# Patient Record
Sex: MALE | Race: ASIAN | NOT HISPANIC OR LATINO | Employment: OTHER | URBAN - METROPOLITAN AREA
[De-identification: names, ages, dates, MRNs, and addresses within clinical notes are randomized per-mention and may not be internally consistent; named-entity substitution may affect disease eponyms.]

---

## 2017-06-14 ENCOUNTER — HOSPITAL ENCOUNTER (OUTPATIENT)
Dept: LAB | Facility: MEDICAL CENTER | Age: 82
End: 2017-06-14
Attending: INTERNAL MEDICINE
Payer: MEDICARE

## 2017-06-14 DIAGNOSIS — I10 ESSENTIAL HYPERTENSION: ICD-10-CM

## 2017-06-14 DIAGNOSIS — E78.00 PURE HYPERCHOLESTEROLEMIA: ICD-10-CM

## 2017-06-14 DIAGNOSIS — E11.9 CONTROLLED TYPE 2 DIABETES MELLITUS WITHOUT COMPLICATION, WITHOUT LONG-TERM CURRENT USE OF INSULIN (HCC): ICD-10-CM

## 2017-06-14 LAB
ALBUMIN SERPL BCP-MCNC: 4.1 G/DL (ref 3.2–4.9)
ALBUMIN/GLOB SERPL: 1.3 G/DL
ALP SERPL-CCNC: 63 U/L (ref 30–99)
ALT SERPL-CCNC: 26 U/L (ref 2–50)
ANION GAP SERPL CALC-SCNC: 7 MMOL/L (ref 0–11.9)
AST SERPL-CCNC: 17 U/L (ref 12–45)
BILIRUB SERPL-MCNC: 0.9 MG/DL (ref 0.1–1.5)
BUN SERPL-MCNC: 17 MG/DL (ref 8–22)
CALCIUM SERPL-MCNC: 9.4 MG/DL (ref 8.5–10.5)
CHLORIDE SERPL-SCNC: 105 MMOL/L (ref 96–112)
CHOLEST SERPL-MCNC: 121 MG/DL (ref 100–199)
CO2 SERPL-SCNC: 25 MMOL/L (ref 20–33)
CREAT SERPL-MCNC: 1.04 MG/DL (ref 0.5–1.4)
CREAT UR-MCNC: 150 MG/DL
EST. AVERAGE GLUCOSE BLD GHB EST-MCNC: 160 MG/DL
GFR SERPL CREATININE-BSD FRML MDRD: >60 ML/MIN/1.73 M 2
GLOBULIN SER CALC-MCNC: 3.2 G/DL (ref 1.9–3.5)
GLUCOSE SERPL-MCNC: 146 MG/DL (ref 65–99)
HBA1C MFR BLD: 7.2 % (ref 0–5.6)
HDLC SERPL-MCNC: 50 MG/DL
LDLC SERPL CALC-MCNC: 51 MG/DL
MICROALBUMIN UR-MCNC: 17.1 MG/DL
MICROALBUMIN/CREAT UR: 114 MG/G (ref 0–30)
POTASSIUM SERPL-SCNC: 4.2 MMOL/L (ref 3.6–5.5)
PROT SERPL-MCNC: 7.3 G/DL (ref 6–8.2)
SODIUM SERPL-SCNC: 137 MMOL/L (ref 135–145)
TRIGL SERPL-MCNC: 99 MG/DL (ref 0–149)

## 2017-06-14 PROCEDURE — 80061 LIPID PANEL: CPT

## 2017-06-14 PROCEDURE — 36415 COLL VENOUS BLD VENIPUNCTURE: CPT

## 2017-06-14 PROCEDURE — 82043 UR ALBUMIN QUANTITATIVE: CPT

## 2017-06-14 PROCEDURE — 80053 COMPREHEN METABOLIC PANEL: CPT

## 2017-06-14 PROCEDURE — 82570 ASSAY OF URINE CREATININE: CPT

## 2017-06-14 PROCEDURE — 83036 HEMOGLOBIN GLYCOSYLATED A1C: CPT | Mod: GA

## 2017-06-15 ENCOUNTER — HOSPITAL ENCOUNTER (OUTPATIENT)
Facility: MEDICAL CENTER | Age: 82
End: 2017-06-15
Attending: INTERNAL MEDICINE
Payer: MEDICARE

## 2017-06-15 PROCEDURE — 82274 ASSAY TEST FOR BLOOD FECAL: CPT

## 2017-06-18 DIAGNOSIS — Z12.11 SCREENING FOR COLON CANCER: ICD-10-CM

## 2017-06-18 LAB — HEMOCCULT STL QL IA: NEGATIVE

## 2017-06-20 ENCOUNTER — OFFICE VISIT (OUTPATIENT)
Dept: MEDICAL GROUP | Age: 82
End: 2017-06-20
Payer: MEDICARE

## 2017-06-20 VITALS
DIASTOLIC BLOOD PRESSURE: 82 MMHG | SYSTOLIC BLOOD PRESSURE: 140 MMHG | WEIGHT: 170 LBS | BODY MASS INDEX: 28.32 KG/M2 | OXYGEN SATURATION: 97 % | HEART RATE: 73 BPM | HEIGHT: 65 IN | TEMPERATURE: 98.1 F

## 2017-06-20 DIAGNOSIS — Z23 NEED FOR PNEUMOCOCCAL VACCINATION: ICD-10-CM

## 2017-06-20 DIAGNOSIS — R80.9 MICROALBUMINURIA: ICD-10-CM

## 2017-06-20 DIAGNOSIS — E78.00 PURE HYPERCHOLESTEROLEMIA: ICD-10-CM

## 2017-06-20 DIAGNOSIS — E11.9 CONTROLLED TYPE 2 DIABETES MELLITUS WITHOUT COMPLICATION, WITHOUT LONG-TERM CURRENT USE OF INSULIN (HCC): ICD-10-CM

## 2017-06-20 DIAGNOSIS — I10 ESSENTIAL HYPERTENSION: ICD-10-CM

## 2017-06-20 PROCEDURE — 90670 PCV13 VACCINE IM: CPT | Performed by: INTERNAL MEDICINE

## 2017-06-20 PROCEDURE — 99214 OFFICE O/P EST MOD 30 MIN: CPT | Mod: 25 | Performed by: INTERNAL MEDICINE

## 2017-06-20 PROCEDURE — G0009 ADMIN PNEUMOCOCCAL VACCINE: HCPCS | Performed by: INTERNAL MEDICINE

## 2017-06-20 ASSESSMENT — PATIENT HEALTH QUESTIONNAIRE - PHQ9: CLINICAL INTERPRETATION OF PHQ2 SCORE: 0

## 2017-06-20 ASSESSMENT — PAIN SCALES - GENERAL: PAINLEVEL: NO PAIN

## 2017-06-20 NOTE — ASSESSMENT & PLAN NOTE
Patient is not taking medication for diabetes. He tries to control his diabetes with diet and exercise. He admitted that his diet has not been very well controlled in past 5 weeks during cruise trip. His A1c was 7.2 on 6/14/17. Patient stated that he has eye exams with ophthalmologist 2 weeks ago. He will bring his eye exam report to us. He stated that he did not have retinopathy.

## 2017-06-20 NOTE — MR AVS SNAPSHOT
"Jim Valle   2017 8:40 AM   Office Visit   MRN: [id number]    Department:  [address] [place] [address]   Dept Phone:  [phone]    Description:  Male : 1935   Provider:  Afua Pinon M.D.           Reason for Visit     Hypertension lab review      Allergies as of 2017     Allergen Noted Reactions    No Known Drug Allergy 2014         You were diagnosed with     Pure hypercholesterolemia   [272.0.ICD-9-CM]       Essential hypertension   [1608696]       Controlled type 2 diabetes mellitus without complication, without long-term current use of insulin (CMS-HCC)   [6438991]       Microalbuminuria   [703708]       Need for pneumococcal vaccination   [491050]         Vital Signs     Blood Pressure Pulse Temperature Height Weight Body Mass Index    140/82 mmHg 73 36.7 °C (98.1 °F) 1.659 m (5' 5.32\") 77.111 kg (170 lb) 28.02 kg/m2    Oxygen Saturation Smoking Status                97% Never Smoker           Basic Information     Date Of Birth Sex Race Ethnicity Preferred Language    1935 Male Unknown Unknown English      Your appointments     Oct 05, 2017 11:00 AM   Established Patient with Afua Pinon M.D.   [address] [address] [phone]           You will be receiving a confirmation call a few days before your appointment from our automated call confirmation system.              Problem List              ICD-10-CM Priority Class Noted - Resolved    Pure hypercholesterolemia E78.00   2014 - Present    Essential hypertension I10   2014 - Present    Hearing loss of right ear H91.91   2014 - Present    Controlled type 2 diabetes mellitus without complication, without long-term current use of insulin (CMS-HCC) E11.9   12/3/2014 - Present    Microalbuminuria R80.9   2015 - Present      Health Maintenance        Date Due Completion Dates    RETINAL SCREENING 11/3/1955 ---    IMM DTaP/Tdap/Td Vaccine " (1 - Tdap) 11/3/1956 ---    IMM ZOSTER VACCINE 11/3/1997 ---    IMM PNEUMOCOCCAL 65+ (ADULT) LOW/MEDIUM RISK SERIES (2 of 2 - PCV13) 10/20/2012 10/20/2011    DIABETES MONOFILAMENT / LE EXAM 11/24/2016 11/24/2015 (Done), 12/3/2014 (Done)    Override on 11/24/2015: Done    Override on 12/3/2014: Done    A1C SCREENING 12/14/2017 6/14/2017, 10/3/2016, 3/7/2016, 3/3/2016, 11/24/2015, 6/3/2015, 11/24/2014, 8/25/2014    FASTING LIPID PROFILE 6/14/2018 6/14/2017, 10/3/2016, 3/3/2016, 6/3/2015, 8/25/2014    URINE ACR / MICROALBUMIN 6/14/2018 6/14/2017, 3/3/2016, 6/3/2015, 8/25/2014    SERUM CREATININE 6/14/2018 6/14/2017, 10/3/2016, 3/3/2016, 6/3/2015, 8/25/2014    COLONOSCOPY 10/10/2026 10/10/2016 (Declined), 5/29/2014 (Declined)    Override on 10/10/2016: Patient Declined    Override on 5/29/2014: Patient Declined (had flex sig 2006 neg and hemoccult neg 2009)            Current Immunizations     13-VALENT PCV PREVNAR  Incomplete    Pneumococcal polysaccharide vaccine (PPSV-23) 10/20/2011      Below and/or attached are the medications your provider expects you to take. Review all of your home medications and newly ordered medications with your provider and/or pharmacist. Follow medication instructions as directed by your provider and/or pharmacist. Please keep your medication list with you and share with your provider. Update the information when medications are discontinued, doses are changed, or new medications (including over-the-counter products) are added; and carry medication information at all times in the event of emergency situations     Allergies:  NO KNOWN DRUG ALLERGY - (reactions not documented)               Medications  Valid as of: June 20, 2017 -  9:15 AM    Generic Name Brand Name Tablet Size Instructions for use    Aspirin (Tablet Delayed Response) ECOTRIN 81 MG Take 81 mg by mouth every day.        Atorvastatin Calcium (Tab) LIPITOR 20 MG Take 1 Tab by mouth every day.        Losartan Potassium (Tab)  COZAAR 25 MG Take 1 Tab by mouth every day.        .                 Medicines prescribed today were sent to:     [id number] - [place], [place] - [address]    [address] [facility] [facility] [place] [address]    Phone: [phone] Fax: [phone]    Open 24 Hours?: No    [id number] - [place] ([place] - [address]    [address] [place] () [place] [address]    Phone: [phone] Fax: [phone]    Open 24 Hours?: No      Medication refill instructions:       If your prescription bottle indicates you have medication refills left, it is not necessary to call your provider’s office. Please contact your pharmacy and they will refill your medication.    If your prescription bottle indicates you do not have any refills left, you may request refills at any time through one of the following ways: The online [facility] system (except Urgent Care), by calling your provider’s office, or by asking your pharmacy to contact your provider’s office with a refill request. Medication refills are processed only during regular business hours and may not be available until the next business day. Your provider may request additional information or to have a follow-up visit with you prior to refilling your medication.   *Please Note: Medication refills are assigned a new Rx number when refilled electronically. Your pharmacy may indicate that no refills were authorized even though a new prescription for the same medication is available at the pharmacy. Please request the medicine by name with the pharmacy before contacting your provider for a refill.        Your To Do List     Future Labs/Procedures Complete By Expires    CBC WITH DIFFERENTIAL  As directed 6/21/2018    COMP METABOLIC PANEL  As directed 6/21/2018    HEMOGLOBIN A1C  As directed 6/21/2018    LIPID PROFILE  As directed 6/21/2018    MICROALBUMIN CREAT RATIO URINE  As directed 6/21/2018         [facility] Access Code: Activation code not generated  Current [facility] Status:  Active

## 2017-06-20 NOTE — ASSESSMENT & PLAN NOTE
Patient still has microalbuminuria which is gradually worse on recent urine test on 6/14/17. Patient stated that he eating a lot of red meat and sweet desert in past 5 weeks during vacation. We discussed to increase the dose of losartan to 50 mg patient still wants to keep the same dose 25 mg. He does not take NSAIDs.  He did not tolerate lisinopril due to cough.      Results for MARC VARELA (MRN [id number]) as of 6/20/2017 10:53   Ref. Range 6/3/2015 06:23 3/3/2016 09:10 6/14/2017 08:18   Micro Alb Creat Ratio Latest Ref Range: 0-30 mg/g 43 (H) 65 (H) 114 (H)   Creatinine, Urine Latest Units: mg/dL 137.60  150.00   Total Volume Latest Units: mL  random    Microalbumin, Urine Random Latest Units: mg/dL 5.9 8.0 17.1   Creatinine Urine Latest Units: mg/dL  123

## 2017-06-20 NOTE — ASSESSMENT & PLAN NOTE
Patient was treated with lisinopril in the past and was switched to losartan on 3/7/16 due to cough. He denies side effects from taking losartan currently. He is taking losartan 25 mg daily. His blood pressure is slightly high in the clinic at 140/82. Patient reported that his blood pressure at home has been around 120/80. He wants to continue losartan with the same dose.

## 2017-06-20 NOTE — ASSESSMENT & PLAN NOTE
Patient is taking atorvastatin 20 mg every evening. He denies side effects from taking atorvastatin. Liver enzymes are within normal.  I reviewed his recent blood tests with him today.    Results for MARC VARELA (MRN [id number]) as of 6/20/2017 10:53   Ref. Range 6/14/2017 08:18   Cholesterol,Tot Latest Ref Range: 100-199 mg/dL 121   Triglycerides Latest Ref Range: 0-149 mg/dL 99   HDL Latest Ref Range: >=40 mg/dL 50   LDL Latest Ref Range: <100 mg/dL 51

## 2017-06-20 NOTE — Clinical Note
[employer]  Afua Pinon M.D.  [address]  [place] [address]  Fax: [phone]   Authorization for Release/Disclosure of   Protected Health Information   Name: MARC VALLE : 1935 SSN: [id number]   Address: [facility] [address]  [place] [address] Phone:    [phone] (home) [phone] (work)   I authorize the entity listed below to release/disclose the PHI below to:   [employer]/Afua Pinon M.D. and Afua Pinon M.D.   Provider or Entity Name:  [facility] eye consultant   Address   City, State, [facility]   Phone:      Fax:     Reason for request: continuity of care   Information to be released:    [  ] LAST COLONOSCOPY,  including any PATH REPORT and follow-up  [  ] LAST FIT/COLOGUARD RESULT [  ] LAST DEXA  [  ] LAST MAMMOGRAM  [  ] LAST PAP  [  ] LAST LABS [xxx  ] RETINA EXAM REPORT  [  ] IMMUNIZATION RECORDS  [  ] Release all info      [  ] Check here and initial the line next to each item to release ALL health information INCLUDING  _____ Care and treatment for drug and / or alcohol abuse  _____ HIV testing, infection status, or AIDS  _____ Genetic Testing    DATES OF SERVICE OR TIME PERIOD TO BE DISCLOSED: _____________  I understand and acknowledge that:  * This Authorization may be revoked at any time by you in writing, except if your health information has already been used or disclosed.  * Your health information that will be used or disclosed as a result of you signing this authorization could be re-disclosed by the recipient. If this occurs, your re-disclosed health information may no longer be protected by State or Federal laws.  * You may refuse to sign this Authorization. Your refusal will not affect your ability to obtain treatment.  * This Authorization becomes effective upon signing and will  on (date) __________.      If no date is indicated, this Authorization will  one (1) year from the signature date.    Name: Marc Valle    Signature:   Date:     2017            PLEASE FAX REQUESTED RECORDS BACK TO: [phone]

## 2017-10-02 ENCOUNTER — HOSPITAL ENCOUNTER (OUTPATIENT)
Dept: LAB | Facility: MEDICAL CENTER | Age: 82
End: 2017-10-02
Attending: INTERNAL MEDICINE
Payer: MEDICARE

## 2017-10-02 DIAGNOSIS — E11.9 CONTROLLED TYPE 2 DIABETES MELLITUS WITHOUT COMPLICATION, WITHOUT LONG-TERM CURRENT USE OF INSULIN (HCC): ICD-10-CM

## 2017-10-02 DIAGNOSIS — R80.9 MICROALBUMINURIA: ICD-10-CM

## 2017-10-02 DIAGNOSIS — I10 ESSENTIAL HYPERTENSION: ICD-10-CM

## 2017-10-02 DIAGNOSIS — E78.00 PURE HYPERCHOLESTEROLEMIA: ICD-10-CM

## 2017-10-02 LAB
ALBUMIN SERPL BCP-MCNC: 4.2 G/DL (ref 3.2–4.9)
ALBUMIN/GLOB SERPL: 1.4 G/DL
ALP SERPL-CCNC: 70 U/L (ref 30–99)
ALT SERPL-CCNC: 33 U/L (ref 2–50)
ANION GAP SERPL CALC-SCNC: 8 MMOL/L (ref 0–11.9)
AST SERPL-CCNC: 24 U/L (ref 12–45)
BASOPHILS # BLD AUTO: 0.6 % (ref 0–1.8)
BASOPHILS # BLD: 0.04 K/UL (ref 0–0.12)
BILIRUB SERPL-MCNC: 0.7 MG/DL (ref 0.1–1.5)
BUN SERPL-MCNC: 20 MG/DL (ref 8–22)
CALCIUM SERPL-MCNC: 9.6 MG/DL (ref 8.5–10.5)
CHLORIDE SERPL-SCNC: 106 MMOL/L (ref 96–112)
CHOLEST SERPL-MCNC: 103 MG/DL (ref 100–199)
CO2 SERPL-SCNC: 25 MMOL/L (ref 20–33)
CREAT SERPL-MCNC: 0.92 MG/DL (ref 0.5–1.4)
CREAT UR-MCNC: 129.8 MG/DL
EOSINOPHIL # BLD AUTO: 0.11 K/UL (ref 0–0.51)
EOSINOPHIL NFR BLD: 1.7 % (ref 0–6.9)
ERYTHROCYTE [DISTWIDTH] IN BLOOD BY AUTOMATED COUNT: 46.5 FL (ref 35.9–50)
EST. AVERAGE GLUCOSE BLD GHB EST-MCNC: 154 MG/DL
GFR SERPL CREATININE-BSD FRML MDRD: >60 ML/MIN/1.73 M 2
GLOBULIN SER CALC-MCNC: 3 G/DL (ref 1.9–3.5)
GLUCOSE SERPL-MCNC: 126 MG/DL (ref 65–99)
HBA1C MFR BLD: 7 % (ref 0–5.6)
HCT VFR BLD AUTO: 47.6 % (ref 42–52)
HDLC SERPL-MCNC: 47 MG/DL
HGB BLD-MCNC: 16.2 G/DL (ref 14–18)
IMM GRANULOCYTES # BLD AUTO: 0.01 K/UL (ref 0–0.11)
IMM GRANULOCYTES NFR BLD AUTO: 0.2 % (ref 0–0.9)
LDLC SERPL CALC-MCNC: 41 MG/DL
LYMPHOCYTES # BLD AUTO: 1.9 K/UL (ref 1–4.8)
LYMPHOCYTES NFR BLD: 29.2 % (ref 22–41)
MCH RBC QN AUTO: 32 PG (ref 27–33)
MCHC RBC AUTO-ENTMCNC: 34 G/DL (ref 33.7–35.3)
MCV RBC AUTO: 94.1 FL (ref 81.4–97.8)
MICROALBUMIN UR-MCNC: 9.9 MG/DL
MICROALBUMIN/CREAT UR: 76 MG/G (ref 0–30)
MONOCYTES # BLD AUTO: 0.63 K/UL (ref 0–0.85)
MONOCYTES NFR BLD AUTO: 9.7 % (ref 0–13.4)
NEUTROPHILS # BLD AUTO: 3.81 K/UL (ref 1.82–7.42)
NEUTROPHILS NFR BLD: 58.6 % (ref 44–72)
NRBC # BLD AUTO: 0 K/UL
NRBC BLD AUTO-RTO: 0 /100 WBC
PLATELET # BLD AUTO: 208 K/UL (ref 164–446)
PMV BLD AUTO: 10 FL (ref 9–12.9)
POTASSIUM SERPL-SCNC: 4.6 MMOL/L (ref 3.6–5.5)
PROT SERPL-MCNC: 7.2 G/DL (ref 6–8.2)
RBC # BLD AUTO: 5.06 M/UL (ref 4.7–6.1)
SODIUM SERPL-SCNC: 139 MMOL/L (ref 135–145)
TRIGL SERPL-MCNC: 76 MG/DL (ref 0–149)
WBC # BLD AUTO: 6.5 K/UL (ref 4.8–10.8)

## 2017-10-02 PROCEDURE — 83036 HEMOGLOBIN GLYCOSYLATED A1C: CPT | Mod: GA

## 2017-10-02 PROCEDURE — 36415 COLL VENOUS BLD VENIPUNCTURE: CPT

## 2017-10-02 PROCEDURE — 80061 LIPID PANEL: CPT

## 2017-10-02 PROCEDURE — 82570 ASSAY OF URINE CREATININE: CPT

## 2017-10-02 PROCEDURE — 80053 COMPREHEN METABOLIC PANEL: CPT

## 2017-10-02 PROCEDURE — 82043 UR ALBUMIN QUANTITATIVE: CPT

## 2017-10-02 PROCEDURE — 85025 COMPLETE CBC W/AUTO DIFF WBC: CPT

## 2017-10-04 ENCOUNTER — TELEPHONE (OUTPATIENT)
Dept: MEDICAL GROUP | Age: 82
End: 2017-10-04

## 2017-10-04 NOTE — ASSESSMENT & PLAN NOTE
Patient is currently taking losartan 25 mg daily. His blood pressure is stable and well-controlled with current regimens. Recent blood tests showed that he has normal electrolytes and kidney function on 10/2/17.

## 2017-10-04 NOTE — ASSESSMENT & PLAN NOTE
Stable. Currently taking atorvastatin 20 mg every evening as directed.  Denies side effects--no myalgias or abdominal pain.    He is exercising regularly.  He is taking ASA daily.  He denies dizziness, claudication, or chest pain.    Results for MARC VARELA (MRN [id number]) as of 10/4/2017 09:37   Ref. Range 10/2/2017 08:21   Cholesterol,Tot Latest Ref Range: 100 - 199 mg/dL 103   Triglycerides Latest Ref Range: 0 - 149 mg/dL 76   HDL Latest Ref Range: >=40 mg/dL 47   LDL Latest Ref Range: <100 mg/dL 41

## 2017-10-04 NOTE — TELEPHONE ENCOUNTER
ESTABLISHED PATIENT PRE-VISIT PLANNING     Note: Patient will not be contacted if there is no indication to call.     1.  Reviewed notes from the last few office visits within the medical group: Yes    2.  If any orders were placed at last visit or intended to be done for this visit (i.e. 6 mos follow-up), do we have Results/Consult Notes?        •  Labs - Labs ordered, completed on 10/2 and results are in chart.       •  Imaging - Imaging was not ordered at last office visit.       •  Referrals - No referrals were ordered at last office visit.    3. Is this appointment scheduled as a Hospital Follow-Up? No    4.  Immunizations were updated in Epic using Web[employer]?: Epic matches WebIZ       •  [employer] Recommendations: FLU, TDAP and ZOSTAVAX (Shingles)    5.  Patient is due for the following Health Maintenance Topics:   Health Maintenance Due   Topic Date Due   • Annual Wellness Visit  1935   • RETINAL SCREENING  11/03/1955   • IMM DTaP/Tdap/Td Vaccine (1 - Tdap) 11/03/1956   • IMM ZOSTER VACCINE  11/03/1997   • IMM INFLUENZA (1) 09/01/2017           6.  Patient was NOT informed to arrive 15 min prior to their scheduled appointment and bring in their medication bottles.

## 2017-10-04 NOTE — ASSESSMENT & PLAN NOTE
Patient tries to control his diabetes with diet and exercise. His A1c was 7 on 10/2/17. He reported follow with ophthalmologist in June 2017. I have not received the report from ophthalmologist yet. Patient stated that he did not have retinopathy. We will request the report from ophthalmologist.  Patient still wanted to control his diabetes with diet and exercise. He stated that he is not very strict compliance on carbohydrate as he likes to eat potato chips. He will try to cut down his carbohydrate intake.

## 2017-10-05 ENCOUNTER — OFFICE VISIT (OUTPATIENT)
Dept: MEDICAL GROUP | Age: 82
End: 2017-10-05
Payer: MEDICARE

## 2017-10-05 VITALS
HEIGHT: 65 IN | BODY MASS INDEX: 27.92 KG/M2 | WEIGHT: 167.6 LBS | TEMPERATURE: 98.2 F | DIASTOLIC BLOOD PRESSURE: 68 MMHG | HEART RATE: 81 BPM | SYSTOLIC BLOOD PRESSURE: 138 MMHG | OXYGEN SATURATION: 96 %

## 2017-10-05 DIAGNOSIS — I10 ESSENTIAL HYPERTENSION: ICD-10-CM

## 2017-10-05 DIAGNOSIS — E78.00 PURE HYPERCHOLESTEROLEMIA: ICD-10-CM

## 2017-10-05 DIAGNOSIS — R80.9 MICROALBUMINURIA: ICD-10-CM

## 2017-10-05 DIAGNOSIS — E11.9 CONTROLLED TYPE 2 DIABETES MELLITUS WITHOUT COMPLICATION, WITHOUT LONG-TERM CURRENT USE OF INSULIN (HCC): ICD-10-CM

## 2017-10-05 PROCEDURE — 99214 OFFICE O/P EST MOD 30 MIN: CPT | Performed by: INTERNAL MEDICINE

## 2017-10-05 NOTE — PROGRESS NOTES
Subjective:   Marc Vraela is a 81 y.o. male here today for evaluation and management of:      Pure hypercholesterolemia  Stable. Currently taking atorvastatin 20 mg every evening as directed.  Denies side effects--no myalgias or abdominal pain.    He is exercising regularly.  He is taking ASA daily.  He denies dizziness, claudication, or chest pain.    Results for MARC VARELA (MRN [id number]) as of 10/4/2017 09:37   Ref. Range 10/2/2017 08:21   Cholesterol,Tot Latest Ref Range: 100 - 199 mg/dL 103   Triglycerides Latest Ref Range: 0 - 149 mg/dL 76   HDL Latest Ref Range: >=40 mg/dL 47   LDL Latest Ref Range: <100 mg/dL 41           Essential hypertension  Patient is currently taking losartan 25 mg daily. His blood pressure is stable and well-controlled with current regimens. Recent blood tests showed that he has normal electrolytes and kidney function on 10/2/17.    Controlled type 2 diabetes mellitus without complication, without long-term current use of insulin (CMS-HCC)  Patient tries to control his diabetes with diet and exercise. His A1c was 7 on 10/2/17. He reported follow with ophthalmologist in June 2017. I have not received the report from ophthalmologist yet. Patient stated that he did not have retinopathy. We will request the report from ophthalmologist.  Patient still wanted to control his diabetes with diet and exercise. He stated that he is not very strict compliance on carbohydrate as he likes to eat potato chips. He will try to cut down his carbohydrate intake.         Current medicines (including changes today)  Current Outpatient Prescriptions   Medication Sig Dispense Refill   • losartan (COZAAR) 25 MG Tab Take 1 Tab by mouth every day. 90 Tab 3   • atorvastatin (LIPITOR) 20 MG Tab Take 1 Tab by mouth every day. 90 Tab 3   • aspirin EC (ECOTRIN) 81 MG TBEC Take 81 mg by mouth every day.       No current facility-administered medications for this visit.      He  has a past medical history of Diabetes  "mellitus type 2, controlled (CMS-HCC) (12/3/2014); Hearing loss of right ear (5/29/2014); Hyperlipidemia (5/29/2014); Hypertension (5/29/2014); Metabolic syndrome (5/29/2014); and Pain in ankle (5/29/2014).    ROS   No chest pain, no shortness of breath, no abdominal pain       Objective:     Blood pressure 138/68, pulse 81, temperature 36.8 °C (98.2 °F), height 1.659 m (5' 5.32\"), weight 76 kg (167 lb 9.6 oz), SpO2 96 %. Body mass index is 27.62 kg/m².   Physical Exam:  General: Alert, oriented and no acute distress.  Eye contact is good, speech goal directed, affect calm  HEENT: conjunctiva non-injected, sclera non-icteric.  Oral mucous membranes pink and moist with no lesions.  Pinna normal.   Lungs: Normal respiratory effort, clear to auscultation bilaterally with good excursion.  CV: regular rate and rhythm. No murmurs.  Abdomen: soft, non distended, nontender, Bowel sound normal.  Ext: no edema, color normal, vascularity normal, temperature normal        Assessment and Plan:   The following treatment plan was discussed     1. Pure hypercholesterolemia  - Well-controlled. Continue current regimens. Recheck lab 1-2 weeks before next follow up visit.  - Advised to eat low fat, low carbohydrate and high fiber diet as well as do cardio physical exercise regularly.   - COMP METABOLIC PANEL; Future  - LIPID PROFILE; Future    2. Essential hypertension  - Well-controlled. Continue current regimens. Recheck lab 1-2 weeks before next follow up visit.  - Recommend to monitor blood pressure and heart rate at home.  - COMP METABOLIC PANEL; Future    3. Controlled type 2 diabetes mellitus without complication, without long-term current use of insulin (CMS-HCC)  - A1c at goal. Patient will continue to control his diabetes with diet and exercise.  - Counseled to comply with diabetes diet.   - Counseled signs and symptoms of hypoglycemia and management of hypoglycemia.   - Recommend to have retinal eye exam once a year.  - " Advised to check both feet daily.   - COMP METABOLIC PANEL; Future  - HEMOGLOBIN A1C; Future  - MICROALBUMIN CREAT RATIO URINE; Future    4. Microalbuminuria  - Improved. Continue losartan 25 mg daily. Discussed to limit sodium intake. Advised to drink more water.     5. Health Maintenance   - We discussed to have flu vaccine, tetanus vaccine, shingles vaccines today. Patient refused to have all of the vaccine and stated that he is not interested on immunization and he does not want to receive any immunization even after discussion of importance of preventative medicine and benefits on immunization.     Followup: Return in about 6 months (around 4/5/2018), or if symptoms worsen or fail to improve, for diabetes, hypertension, hypercholesterolemia, microalbuminuria, and lab review.      Please note that this dictation was created using voice recognition software. I have made every reasonable attempt to correct obvious errors, but I expect that there may have unintended errors in text, spelling, punctuation, or grammar that I did not discover.

## 2017-10-05 NOTE — LETTER
[employer]  Afua Pinon M.D.  [address]  [place] [address]  Fax: [phone]   Authorization for Release/Disclosure of   Protected Health Information   Name: MARC VALLE : 1935 SSN: [id number]   Address: [facility] [address]  [place] [address] Phone:    [phone] (home) [phone] (work)   I authorize the entity listed below to release/disclose the PHI below to:   [employer]/Afua Pinon M.D. and Afua Pinon M.D.   Provider or Entity Name:  Dr. Harley Chamberlain   Address   [facility]  [address].  Phone:   [phone]    Fax:  [phone]   Reason for request: continuity of care   Information to be released:    [  ] LAST COLONOSCOPY,  including any PATH REPORT and follow-up  [  ] LAST FIT/COLOGUARD RESULT [  ] LAST DEXA  [  ] LAST MAMMOGRAM  [  ] LAST PAP  [  ] LAST LABS [ X ] RETINA EXAM REPORT  [  ] IMMUNIZATION RECORDS  [  ] Release all info      [  ] Check here and initial the line next to each item to release ALL health information INCLUDING  _____ Care and treatment for drug and / or alcohol abuse  _____ HIV testing, infection status, or AIDS  _____ Genetic Testing    DATES OF SERVICE OR TIME PERIOD TO BE DISCLOSED: _____________  I understand and acknowledge that:  * This Authorization may be revoked at any time by you in writing, except if your health information has already been used or disclosed.  * Your health information that will be used or disclosed as a result of you signing this authorization could be re-disclosed by the recipient. If this occurs, your re-disclosed health information may no longer be protected by State or Federal laws.  * You may refuse to sign this Authorization. Your refusal will not affect your ability to obtain treatment.  * This Authorization becomes effective upon signing and will  on (date) __________.      If no date is indicated, this Authorization will  one (1) year from the signature date.    Name: Marc Valle    Signature:   Date:      10/5/2017       PLEASE FAX REQUESTED RECORDS BACK TO: [phone]

## 2017-10-06 DIAGNOSIS — I10 ESSENTIAL HYPERTENSION: ICD-10-CM

## 2017-10-06 RX ORDER — LOSARTAN POTASSIUM 25 MG/1
TABLET ORAL
Qty: 90 TAB | Refills: 3 | Status: SHIPPED | OUTPATIENT
Start: 2017-10-06 | End: 2017-10-10 | Stop reason: SDUPTHER

## 2017-10-10 DIAGNOSIS — I10 ESSENTIAL HYPERTENSION: ICD-10-CM

## 2017-10-10 DIAGNOSIS — E78.00 PURE HYPERCHOLESTEROLEMIA: ICD-10-CM

## 2017-10-10 RX ORDER — ATORVASTATIN CALCIUM 20 MG/1
20 TABLET, FILM COATED ORAL DAILY
Qty: 90 TAB | Refills: 3 | Status: SHIPPED | OUTPATIENT
Start: 2017-10-10 | End: 2018-04-17 | Stop reason: SDUPTHER

## 2017-10-10 RX ORDER — LOSARTAN POTASSIUM 25 MG/1
25 TABLET ORAL
Qty: 90 TAB | Refills: 3 | Status: SHIPPED | OUTPATIENT
Start: 2017-10-10 | End: 2018-04-17 | Stop reason: SDUPTHER

## 2017-10-10 NOTE — TELEPHONE ENCOUNTER
Was the patient seen in the last year in this department? Yes     Does patient have an active prescription for medications requested? No     Received Request Via: Pharmacy     Pharmacy did not get rx for losartan sent on 10/06

## 2018-04-12 ENCOUNTER — HOSPITAL ENCOUNTER (OUTPATIENT)
Dept: LAB | Facility: MEDICAL CENTER | Age: 83
End: 2018-04-12
Attending: INTERNAL MEDICINE
Payer: MEDICARE

## 2018-04-12 DIAGNOSIS — I10 ESSENTIAL HYPERTENSION: ICD-10-CM

## 2018-04-12 DIAGNOSIS — E78.00 PURE HYPERCHOLESTEROLEMIA: ICD-10-CM

## 2018-04-12 DIAGNOSIS — E11.9 CONTROLLED TYPE 2 DIABETES MELLITUS WITHOUT COMPLICATION, WITHOUT LONG-TERM CURRENT USE OF INSULIN (HCC): ICD-10-CM

## 2018-04-12 LAB
ALBUMIN SERPL BCP-MCNC: 4.1 G/DL (ref 3.2–4.9)
ALBUMIN/GLOB SERPL: 1.3 G/DL
ALP SERPL-CCNC: 54 U/L (ref 30–99)
ALT SERPL-CCNC: 28 U/L (ref 2–50)
ANION GAP SERPL CALC-SCNC: 6 MMOL/L (ref 0–11.9)
AST SERPL-CCNC: 23 U/L (ref 12–45)
BILIRUB SERPL-MCNC: 0.9 MG/DL (ref 0.1–1.5)
BUN SERPL-MCNC: 16 MG/DL (ref 8–22)
CALCIUM SERPL-MCNC: 9.7 MG/DL (ref 8.5–10.5)
CHLORIDE SERPL-SCNC: 105 MMOL/L (ref 96–112)
CHOLEST SERPL-MCNC: 116 MG/DL (ref 100–199)
CO2 SERPL-SCNC: 27 MMOL/L (ref 20–33)
CREAT SERPL-MCNC: 0.94 MG/DL (ref 0.5–1.4)
CREAT UR-MCNC: 84.3 MG/DL
EST. AVERAGE GLUCOSE BLD GHB EST-MCNC: 160 MG/DL
GLOBULIN SER CALC-MCNC: 3.2 G/DL (ref 1.9–3.5)
GLUCOSE SERPL-MCNC: 155 MG/DL (ref 65–99)
HBA1C MFR BLD: 7.2 % (ref 0–5.6)
HDLC SERPL-MCNC: 52 MG/DL
LDLC SERPL CALC-MCNC: 48 MG/DL
MICROALBUMIN UR-MCNC: 9.7 MG/DL
MICROALBUMIN/CREAT UR: 115 MG/G (ref 0–30)
POTASSIUM SERPL-SCNC: 4.3 MMOL/L (ref 3.6–5.5)
PROT SERPL-MCNC: 7.3 G/DL (ref 6–8.2)
SODIUM SERPL-SCNC: 138 MMOL/L (ref 135–145)
TRIGL SERPL-MCNC: 81 MG/DL (ref 0–149)

## 2018-04-12 PROCEDURE — 80061 LIPID PANEL: CPT

## 2018-04-12 PROCEDURE — 82570 ASSAY OF URINE CREATININE: CPT

## 2018-04-12 PROCEDURE — 83036 HEMOGLOBIN GLYCOSYLATED A1C: CPT | Mod: GA

## 2018-04-12 PROCEDURE — 80053 COMPREHEN METABOLIC PANEL: CPT

## 2018-04-12 PROCEDURE — 82043 UR ALBUMIN QUANTITATIVE: CPT

## 2018-04-12 PROCEDURE — 36415 COLL VENOUS BLD VENIPUNCTURE: CPT

## 2018-04-17 ENCOUNTER — OFFICE VISIT (OUTPATIENT)
Dept: MEDICAL GROUP | Age: 83
End: 2018-04-17
Payer: MEDICARE

## 2018-04-17 VITALS
TEMPERATURE: 96.7 F | RESPIRATION RATE: 16 BRPM | WEIGHT: 171 LBS | HEIGHT: 65 IN | SYSTOLIC BLOOD PRESSURE: 132 MMHG | DIASTOLIC BLOOD PRESSURE: 78 MMHG | HEART RATE: 86 BPM | BODY MASS INDEX: 28.49 KG/M2 | OXYGEN SATURATION: 97 %

## 2018-04-17 DIAGNOSIS — E11.9 CONTROLLED TYPE 2 DIABETES MELLITUS WITHOUT COMPLICATION, WITHOUT LONG-TERM CURRENT USE OF INSULIN (HCC): ICD-10-CM

## 2018-04-17 DIAGNOSIS — E78.00 PURE HYPERCHOLESTEROLEMIA: ICD-10-CM

## 2018-04-17 DIAGNOSIS — R80.9 MICROALBUMINURIA: ICD-10-CM

## 2018-04-17 DIAGNOSIS — I10 ESSENTIAL HYPERTENSION: ICD-10-CM

## 2018-04-17 PROCEDURE — 99214 OFFICE O/P EST MOD 30 MIN: CPT | Performed by: INTERNAL MEDICINE

## 2018-04-17 RX ORDER — LOSARTAN POTASSIUM 25 MG/1
25 TABLET ORAL
Qty: 90 TAB | Refills: 3 | Status: SHIPPED | OUTPATIENT
Start: 2018-04-17 | End: 2019-07-16 | Stop reason: SDUPTHER

## 2018-04-17 RX ORDER — ATORVASTATIN CALCIUM 20 MG/1
20 TABLET, FILM COATED ORAL DAILY
Qty: 90 TAB | Refills: 3 | Status: SHIPPED | OUTPATIENT
Start: 2018-04-17 | End: 2019-07-16 | Stop reason: SDUPTHER

## 2018-04-17 NOTE — ASSESSMENT & PLAN NOTE
Patient is not taking medication for diabetes. He tried to control diabetes with diet and physical exercise. He walks uphill 25 minutes 3 times a week regularly and he plays [employer] 3-4 times a week. He stated that he has not been very compliance with diet during past few months. Recent A1c was 7.2 on 4/12/18. His A1c gradually increased since last year from 6.7 to 7.2 currently. Patient still does not want to take medication for diabetes. He wants to continue to control diabetes with diet and exercise.

## 2018-04-17 NOTE — ASSESSMENT & PLAN NOTE
Patient still has microalbuminuria at 115. He is taking losartan 25 mg daily. Patient declined to take medication for diabetes. He will try to control better with diet and exercise. Patient is advised to avoid processed sugar and soda and also advised to eat potato chips. Patient is advised to increase water intake. His creatinine, BUN and GFR on 4/12/18 are within normal.

## 2018-04-17 NOTE — PROGRESS NOTES
Subjective:   Marc Varela is a 82 y.o. male here today for evaluation and management of:      Controlled type 2 diabetes mellitus without complication, without long-term current use of insulin (CMS-HCC)  Patient is not taking medication for diabetes. He tried to control diabetes with diet and physical exercise. He walks uphill 25 minutes 3 times a week regularly and he plays [employer] 3-4 times a week. He stated that he has not been very compliance with diet during past few months. Recent A1c was 7.2 on 4/12/18. His A1c gradually increased since last year from 6.7 to 7.2 currently. Patient still does not want to take medication for diabetes. He wants to continue to control diabetes with diet and exercise.    Essential hypertension  He is taking losartan 25 mg daily. His blood pressure is stable. He denies side effects from taking losartan. His electrolytes and kidney functions on 4/12/18 are within normal.    Microalbuminuria  Patient still has microalbuminuria at 115. He is taking losartan 25 mg daily. Patient declined to take medication for diabetes. He will try to control better with diet and exercise. Patient is advised to avoid processed sugar and soda and also advised to eat potato chips. Patient is advised to increase water intake. His creatinine, BUN and GFR on 4/12/18 are within normal.    Pure hypercholesterolemia  Patient is taking atorvastatin 20 mg every evening. He denies side effects from taking atorvastatin. His LDL at goal. His liver enzymes are within normal. I review his blood tests with him in clinic today.    Results for MARC VARELA (MRN [id number]) as of 4/17/2018 12:46   Ref. Range 4/12/2018 08:33   Cholesterol,Tot Latest Ref Range: 100 - 199 mg/dL 116   Triglycerides Latest Ref Range: 0 - 149 mg/dL 81   HDL Latest Ref Range: >=40 mg/dL 52   LDL Latest Ref Range: <100 mg/dL 48          Current medicines (including changes today)  Current Outpatient Prescriptions   Medication Sig Dispense Refill   •  "losartan (COZAAR) 25 MG Tab Take 1 Tab by mouth every day. 90 Tab 3   • atorvastatin (LIPITOR) 20 MG Tab Take 1 Tab by mouth every day. 90 Tab 3   • aspirin EC (ECOTRIN) 81 MG TBEC Take 81 mg by mouth every day.       No current facility-administered medications for this visit.      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    ROS   No chest pain, no shortness of breath, no abdominal pain       Objective:     Blood pressure 132/78, pulse 86, temperature 35.9 °C (96.7 °F), resp. rate 16, height 1.651 m (5' 5\"), weight 77.6 kg (171 lb), SpO2 97 %. Body mass index is 28.46 kg/m².   Physical Exam:  General: Alert, oriented and no acute distress.  Eye contact is good, speech goal directed, affect calm  HEENT: conjunctiva non-injected, sclera non-icteric.  Oral mucous membranes pink and moist with no lesions.  Pinna normal.   Lungs: Normal respiratory effort, clear to auscultation bilaterally with good excursion.  CV: regular rate and rhythm. No murmurs.   Abdomen: soft, non distended, nontender, Bowel sound normal.  Ext: no edema, color normal, vascularity normal, temperature normal        Assessment and Plan:   The following treatment plan was discussed     1. Controlled type 2 diabetes mellitus without complication, without long-term current use of insulin (CMS-HCC)  - A1c slightly increased to 7.2 in recent blood tests. Patient wants to continue to control with diet and exercise and declined to take medication for diabetes. Recheck lab 1-2 weeks before next follow up visit.  - Counseled to comply with medication and diet.   - Counseled signs and symptoms of hypoglycemia and management of hypoglycemia.   - Recommend to have retinal eye exam once a year.  - Advised to check both feet daily.   - COMP METABOLIC PANEL; " Future  - HEMOGLOBIN A1C; Future  - MICROALBUMIN CREAT RATIO URINE; Future    2. Essential hypertension  - Well-controlled. Continue current regimens, losartan 25 mg daily. Recheck lab 1-2 weeks before next follow up visit.  - Recommend to monitor blood pressure and heart rate at home.  - CBC WITH DIFFERENTIAL; Future  - COMP METABOLIC PANEL; Future  - MICROALBUMIN CREAT RATIO URINE; Future  - losartan (COZAAR) 25 MG Tab; Take 1 Tab by mouth every day.  Dispense: 90 Tab; Refill: 3    3. Pure hypercholesterolemia  - Well-controlled. Continue current regimens, Lipitor 20 mg every evening. Recheck lab 1-2 weeks before next follow up visit.  - Advised to eat low fat, low carbohydrate and high fiber diet as well as do cardio physical exercise regularly.   - COMP METABOLIC PANEL; Future  - LIPID PROFILE; Future  - atorvastatin (LIPITOR) 20 MG Tab; Take 1 Tab by mouth every day.  Dispense: 90 Tab; Refill: 3    4. Microalbuminuria  - Chronic and stable. Recommend to continue losartan 25 mg daily. Discussed to control diabetes, blood pressure and cholesterol well. Advised to keep well hydrated.      Followup: Return in about 6 months (around 10/17/2018), or if symptoms worsen or fail to improve, for diabetes, hypertension, hyperlipidemia, microalbuminuria, lab review.      Please note that this dictation was created using voice recognition software. I have made every reasonable attempt to correct obvious errors, but I expect that there may have unintended errors in text, spelling, punctuation, or grammar that I did not discover.

## 2018-04-17 NOTE — ASSESSMENT & PLAN NOTE
He is taking losartan 25 mg daily. His blood pressure is stable. He denies side effects from taking losartan. His electrolytes and kidney functions on 4/12/18 are within normal.

## 2018-04-17 NOTE — ASSESSMENT & PLAN NOTE
Patient is taking atorvastatin 20 mg every evening. He denies side effects from taking atorvastatin. His LDL at goal. His liver enzymes are within normal. I review his blood tests with him in clinic today.    Results for MARC VARELA (MRN [id number]) as of 4/17/2018 12:46   Ref. Range 4/12/2018 08:33   Cholesterol,Tot Latest Ref Range: 100 - 199 mg/dL 116   Triglycerides Latest Ref Range: 0 - 149 mg/dL 81   HDL Latest Ref Range: >=40 mg/dL 52   LDL Latest Ref Range: <100 mg/dL 48

## 2018-08-22 ENCOUNTER — PATIENT OUTREACH (OUTPATIENT)
Dept: HEALTH INFORMATION MANAGEMENT | Facility: OTHER | Age: 83
End: 2018-08-22

## 2018-08-22 NOTE — PROGRESS NOTES
Outcome: Left Message    Please transfer to Patient Outreach Team at [phone] when patient returns call.    WebIZ Checked & Epic Updated:  yes    Attempt # 1

## 2018-10-21 NOTE — PROGRESS NOTES
Subjective:   Marc Varela is a 81 y.o. male here today for evaluation and management of:      Pure hypercholesterolemia  Patient is taking atorvastatin 20 mg every evening. He denies side effects from taking atorvastatin. Liver enzymes are within normal.  I reviewed his recent blood tests with him today.    Results for MARC VARELA (MRN [id number]) as of 6/20/2017 10:53   Ref. Range 6/14/2017 08:18   Cholesterol,Tot Latest Ref Range: 100-199 mg/dL 121   Triglycerides Latest Ref Range: 0-149 mg/dL 99   HDL Latest Ref Range: >=40 mg/dL 50   LDL Latest Ref Range: <100 mg/dL 51       Microalbuminuria  Patient still has microalbuminuria which is gradually worse on recent urine test on 6/14/17. Patient stated that he eating a lot of red meat and sweet desert in past 5 weeks during vacation. We discussed to increase the dose of losartan to 50 mg patient still wants to keep the same dose 25 mg. He does not take NSAIDs.  He did not tolerate lisinopril due to cough.      Results for MARC VARELA (MRN [id number]) as of 6/20/2017 10:53   Ref. Range 6/3/2015 06:23 3/3/2016 09:10 6/14/2017 08:18   Micro Alb Creat Ratio Latest Ref Range: 0-30 mg/g 43 (H) 65 (H) 114 (H)   Creatinine, Urine Latest Units: mg/dL 137.60  150.00   Total Volume Latest Units: mL  random    Microalbumin, Urine Random Latest Units: mg/dL 5.9 8.0 17.1   Creatinine Urine Latest Units: mg/dL  123        Essential hypertension  Patient was treated with lisinopril in the past and was switched to losartan on 3/7/16 due to cough. He denies side effects from taking losartan currently. He is taking losartan 25 mg daily. His blood pressure is slightly high in the clinic at 140/82. Patient reported that his blood pressure at home has been around 120/80. He wants to continue losartan with the same dose.     Controlled type 2 diabetes mellitus without complication, without long-term current use of insulin (CMS-HCC)  Patient is not taking medication for diabetes. He tries to  "control his diabetes with diet and exercise. He admitted that his diet has not been very well controlled in past 5 weeks during cruise trip. His A1c was 7.2 on 6/14/17. Patient stated that he has eye exams with ophthalmologist 2 weeks ago. He will bring his eye exam report to us. He stated that he did not have retinopathy.           Current medicines (including changes today)  Current Outpatient Prescriptions   Medication Sig Dispense Refill   • losartan (COZAAR) 25 MG Tab Take 1 Tab by mouth every day. 90 Tab 3   • atorvastatin (LIPITOR) 20 MG Tab Take 1 Tab by mouth every day. 90 Tab 3   • aspirin EC (ECOTRIN) 81 MG TBEC Take 81 mg by mouth every day.       No current facility-administered medications for this visit.     He  has a past medical history of Hyperlipidemia (5/29/2014); Metabolic syndrome (5/29/2014); Hypertension (5/29/2014); Pain in ankle (5/29/2014); Hearing loss of right ear (5/29/2014); and Diabetes mellitus type 2, controlled (CMS-[facility]) (12/3/2014).    ROS   No chest pain, no shortness of breath, no abdominal pain       Objective:     Blood pressure 140/82, pulse 73, temperature 36.7 °C (98.1 °F), height 1.659 m (5' 5.32\"), weight 77.111 kg (170 lb), SpO2 97 %. Body mass index is 28.02 kg/(m^2).   Physical Exam:  General: Alert, oriented and no acute distress.  Eye contact is good, speech goal directed, affect calm  HEENT: conjunctiva non-injected, sclera non-icteric.  Oral mucous membranes pink and moist with no lesions.  Pinna normal.   Lungs: Normal respiratory effort, clear to auscultation bilaterally with good excursion.  CV: regular rate and rhythm. No murmurs.   Abdomen: soft, non distended, nontender, Bowel sound normal.  Ext: no edema, color normal, vascularity normal, temperature normal    Diabetic foot exam  Monofilament testing with a 10 gram force: sensation: intact bilaterally  Visual Inspection: Feet without maceration, ulcers, or fissures.  Pedal pulses: intact " bilaterally      Assessment and Plan:   The following treatment plan was discussed     1. Pure hypercholesterolemia  - Well-controlled. Continue current regimens. Recheck lab 1-2 weeks before next follow up visit.  - Advised to eat low fat, low carbohydrate and high fiber diet as well as do cardio physical exercise regularly.   - COMP METABOLIC PANEL; Future  - LIPID PROFILE; Future    2. Essential hypertension  - Stable. Patient checked his blood pressure regularly at home and stated that his blood pressure has been stable. We will continue losartan 25 mg daily.  - CBC WITH DIFFERENTIAL; Future  - COMP METABOLIC PANEL; Future    3. Controlled type 2 diabetes mellitus without complication, without long-term current use of insulin (CMS-HCC)  - A1c slightly increased. His recent A1c was 7.2, which is still at goal for his age. Patient will continue to control with diet and exercise.  - COMP METABOLIC PANEL; Future  - HEMOGLOBIN A1C; Future  - MICROALBUMIN CREAT RATIO URINE; Future  - Diabetic Monofilament Lower Extremity Exam    4. Microalbuminuria  - Not well controlled. Patient does not want to increase losartan. Advised to control well on blood sugar, blood pressure. Advised to increase water intake. Advised to avoid NSAIDs.  - MICROALBUMIN CREAT RATIO URINE; Future    5. Need for pneumococcal vaccination  - Prevnar 13 vaccine was given today after reviewing risks and benefits as well as side effects of vaccine.  - PNEUMOCOCCAL CONJUGATE VACCINE 13-VALENT    6. Health Maintenance  We discussed to stop doing colon cancer screening given his age. Recent fecal fit test was negative on 6/15/17. Patient denied family history of colon cancer. He has regular good bowel movement. He agreed to stop colon cancer screening. Patient has retinal exam 2 weeks ago. We will obtain the report from ophthalmologist. Patient will consider shingles vaccine and tetanus vaccine in the future. He does not want to have shingles and tetanus  shot today. He agreed to have Prevnar 13. He already received Pneumovax 23 8 years ago in [place].    Followup: Return in about 4 months (around 10/5/2017), or if symptoms worsen or fail to improve, for hypertension, diabetes, hyperlipidemia, microalbuminuria, lab review.      Please note that this dictation was created using voice recognition software. I have made every reasonable attempt to correct obvious errors, but I expect that there may have unintended errors in text, spelling, punctuation, or grammar that I did not discover.              Crutches

## 2018-10-27 ENCOUNTER — HOSPITAL ENCOUNTER (OUTPATIENT)
Dept: LAB | Facility: MEDICAL CENTER | Age: 83
End: 2018-10-27
Attending: INTERNAL MEDICINE
Payer: MEDICARE

## 2018-10-27 DIAGNOSIS — E11.9 CONTROLLED TYPE 2 DIABETES MELLITUS WITHOUT COMPLICATION, WITHOUT LONG-TERM CURRENT USE OF INSULIN (HCC): ICD-10-CM

## 2018-10-27 DIAGNOSIS — I10 ESSENTIAL HYPERTENSION: ICD-10-CM

## 2018-10-27 DIAGNOSIS — E78.00 PURE HYPERCHOLESTEROLEMIA: ICD-10-CM

## 2018-10-27 LAB
ALBUMIN SERPL BCP-MCNC: 4.2 G/DL (ref 3.2–4.9)
ALBUMIN/GLOB SERPL: 1.4 G/DL
ALP SERPL-CCNC: 57 U/L (ref 30–99)
ALT SERPL-CCNC: 31 U/L (ref 2–50)
ANION GAP SERPL CALC-SCNC: 8 MMOL/L (ref 0–11.9)
AST SERPL-CCNC: 24 U/L (ref 12–45)
BASOPHILS # BLD AUTO: 0.9 % (ref 0–1.8)
BASOPHILS # BLD: 0.06 K/UL (ref 0–0.12)
BILIRUB SERPL-MCNC: 1 MG/DL (ref 0.1–1.5)
BUN SERPL-MCNC: 17 MG/DL (ref 8–22)
CALCIUM SERPL-MCNC: 9.7 MG/DL (ref 8.5–10.5)
CHLORIDE SERPL-SCNC: 106 MMOL/L (ref 96–112)
CHOLEST SERPL-MCNC: 123 MG/DL (ref 100–199)
CO2 SERPL-SCNC: 25 MMOL/L (ref 20–33)
CREAT SERPL-MCNC: 1.04 MG/DL (ref 0.5–1.4)
CREAT UR-MCNC: 128.7 MG/DL
EOSINOPHIL # BLD AUTO: 0.09 K/UL (ref 0–0.51)
EOSINOPHIL NFR BLD: 1.4 % (ref 0–6.9)
ERYTHROCYTE [DISTWIDTH] IN BLOOD BY AUTOMATED COUNT: 47 FL (ref 35.9–50)
EST. AVERAGE GLUCOSE BLD GHB EST-MCNC: 163 MG/DL
FASTING STATUS PATIENT QL REPORTED: NORMAL
GLOBULIN SER CALC-MCNC: 3 G/DL (ref 1.9–3.5)
GLUCOSE SERPL-MCNC: 136 MG/DL (ref 65–99)
HBA1C MFR BLD: 7.3 % (ref 0–5.6)
HCT VFR BLD AUTO: 45.7 % (ref 42–52)
HDLC SERPL-MCNC: 49 MG/DL
HGB BLD-MCNC: 15.7 G/DL (ref 14–18)
IMM GRANULOCYTES # BLD AUTO: 0.02 K/UL (ref 0–0.11)
IMM GRANULOCYTES NFR BLD AUTO: 0.3 % (ref 0–0.9)
LDLC SERPL CALC-MCNC: 57 MG/DL
LYMPHOCYTES # BLD AUTO: 1.88 K/UL (ref 1–4.8)
LYMPHOCYTES NFR BLD: 29 % (ref 22–41)
MCH RBC QN AUTO: 32.8 PG (ref 27–33)
MCHC RBC AUTO-ENTMCNC: 34.4 G/DL (ref 33.7–35.3)
MCV RBC AUTO: 95.4 FL (ref 81.4–97.8)
MICROALBUMIN UR-MCNC: 11.8 MG/DL
MICROALBUMIN/CREAT UR: 92 MG/G (ref 0–30)
MONOCYTES # BLD AUTO: 0.58 K/UL (ref 0–0.85)
MONOCYTES NFR BLD AUTO: 8.9 % (ref 0–13.4)
NEUTROPHILS # BLD AUTO: 3.86 K/UL (ref 1.82–7.42)
NEUTROPHILS NFR BLD: 59.5 % (ref 44–72)
NRBC # BLD AUTO: 0 K/UL
NRBC BLD-RTO: 0 /100 WBC
PLATELET # BLD AUTO: 214 K/UL (ref 164–446)
PMV BLD AUTO: 10.2 FL (ref 9–12.9)
POTASSIUM SERPL-SCNC: 4 MMOL/L (ref 3.6–5.5)
PROT SERPL-MCNC: 7.2 G/DL (ref 6–8.2)
RBC # BLD AUTO: 4.79 M/UL (ref 4.7–6.1)
SODIUM SERPL-SCNC: 139 MMOL/L (ref 135–145)
TRIGL SERPL-MCNC: 85 MG/DL (ref 0–149)
WBC # BLD AUTO: 6.5 K/UL (ref 4.8–10.8)

## 2018-10-27 PROCEDURE — 85025 COMPLETE CBC W/AUTO DIFF WBC: CPT

## 2018-10-27 PROCEDURE — 80053 COMPREHEN METABOLIC PANEL: CPT

## 2018-10-27 PROCEDURE — 36415 COLL VENOUS BLD VENIPUNCTURE: CPT

## 2018-10-27 PROCEDURE — 83036 HEMOGLOBIN GLYCOSYLATED A1C: CPT

## 2018-10-27 PROCEDURE — 82043 UR ALBUMIN QUANTITATIVE: CPT

## 2018-10-27 PROCEDURE — 82570 ASSAY OF URINE CREATININE: CPT

## 2018-10-27 PROCEDURE — 80061 LIPID PANEL: CPT

## 2018-11-01 ENCOUNTER — OFFICE VISIT (OUTPATIENT)
Dept: MEDICAL GROUP | Age: 83
End: 2018-11-01
Payer: MEDICARE

## 2018-11-01 VITALS
TEMPERATURE: 97.8 F | SYSTOLIC BLOOD PRESSURE: 134 MMHG | BODY MASS INDEX: 28.06 KG/M2 | OXYGEN SATURATION: 93 % | HEART RATE: 79 BPM | WEIGHT: 168.4 LBS | DIASTOLIC BLOOD PRESSURE: 80 MMHG | HEIGHT: 65 IN

## 2018-11-01 DIAGNOSIS — I10 ESSENTIAL HYPERTENSION: ICD-10-CM

## 2018-11-01 DIAGNOSIS — E78.00 PURE HYPERCHOLESTEROLEMIA: ICD-10-CM

## 2018-11-01 DIAGNOSIS — R80.9 MICROALBUMINURIA: ICD-10-CM

## 2018-11-01 DIAGNOSIS — E11.9 CONTROLLED TYPE 2 DIABETES MELLITUS WITHOUT COMPLICATION, WITHOUT LONG-TERM CURRENT USE OF INSULIN (HCC): ICD-10-CM

## 2018-11-01 PROCEDURE — 99214 OFFICE O/P EST MOD 30 MIN: CPT | Performed by: INTERNAL MEDICINE

## 2018-11-01 ASSESSMENT — PATIENT HEALTH QUESTIONNAIRE - PHQ9: CLINICAL INTERPRETATION OF PHQ2 SCORE: 0

## 2018-11-01 NOTE — ASSESSMENT & PLAN NOTE
Patient is taking atorvastatin 20 mg every evening.  His cholesterol is stable and well controlled with current regimens.  I discussed his blood test result with him in clinic today.  He does not have side effects from taking atorvastatin.    Results for MARC VARELA (MRN [id number]) as of 11/1/2018 10:10   Ref. Range 10/27/2018 08:31   Cholesterol,Tot Latest Ref Range: 100 - 199 mg/dL 123   Triglycerides Latest Ref Range: 0 - 149 mg/dL 85   HDL Latest Ref Range: >=40 mg/dL 49   LDL Latest Ref Range: <100 mg/dL 57

## 2018-11-01 NOTE — PROGRESS NOTES
Subjective:   Marc Varela is a 82 y.o. male here today for evaluation and management of:      Pure hypercholesterolemia  Patient is taking atorvastatin 20 mg every evening.  His cholesterol is stable and well controlled with current regimens.  I discussed his blood test result with him in clinic today.  He does not have side effects from taking atorvastatin.    Results for MARC VARELA (MRN [id number]) as of 11/1/2018 10:10   Ref. Range 10/27/2018 08:31   Cholesterol,Tot Latest Ref Range: 100 - 199 mg/dL 123   Triglycerides Latest Ref Range: 0 - 149 mg/dL 85   HDL Latest Ref Range: >=40 mg/dL 49   LDL Latest Ref Range: <100 mg/dL 57       Microalbuminuria  His microalbuminuria slightly improved in recent urine chemistry test.  He is taking losartan 25 mg daily.  His electrolytes and kidney functions are within normal.    Essential hypertension  His blood pressure is well controlled with losartan.  He denied side effects from taking it.  I discussed blood test result with him in clinic today.    Controlled type 2 diabetes mellitus without complication, without long-term current use of insulin (CMS-HCC)  Patient tries to control his diabetes with diet and physical exercise.  His A1c is not at goal yet.  Recent A1c was 7.3 on 10/27/18.  I discussed with patient to try metformin 500 mg once a day with large meal.  I discussed potential side effects of metformin with patient.  He agreed to try metformin 500 mg once a day.         Current medicines (including changes today)  Current Outpatient Prescriptions   Medication Sig Dispense Refill   • metFORMIN (GLUCOPHAGE) 500 MG Tab Take 1 Tab by mouth every day. Take with meal. 90 Tab 3   • losartan (COZAAR) 25 MG Tab Take 1 Tab by mouth every day. 90 Tab 3   • atorvastatin (LIPITOR) 20 MG Tab Take 1 Tab by mouth every day. 90 Tab 3   • aspirin EC (ECOTRIN) 81 MG TBEC Take 81 mg by mouth every day.       No current facility-administered medications for this visit.      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    ROS   No chest pain, no shortness of breath, no abdominal pain       Objective:     Blood pressure 134/80, pulse 79, temperature 36.6 °C (97.8 °F), temperature source Temporal, height 1.651 m (5' 5\"), weight 76.4 kg (168 lb 6.4 oz), SpO2 93 %. Body mass index is 28.02 kg/m².   Physical Exam:  General: Alert, oriented and no acute distress.  Eye contact is good, speech goal directed, affect calm  HEENT: conjunctiva non-injected, sclera non-icteric.  Oral mucous membranes pink and moist with no lesions.  Pinna normal.   Lungs: Normal respiratory effort, clear to auscultation bilaterally with good excursion.  CV: regular rate and rhythm. No murmurs.  Abdomen: soft, non distended, nontender, Bowel sound normal.  Ext: no edema, color normal, vascularity normal, temperature normal    Diabetic foot exam:  Monofilament testing with a 10 gram force: sensation: intact bilaterally  Visual Inspection: Feet without maceration, ulcers, or fissures.  Pedal pulses: intact bilaterally      Assessment and Plan:   The following treatment plan was discussed     1. Pure hypercholesterolemia  - Well-controlled. Continue current regimens, atorvastatin 20 mg every evening. Recheck lab 1-2 weeks before next follow up visit.  - Reviewed the risks and benefits of treatment and potential side effects of medication.  - Advised to eat low fat, low carbohydrate and high fiber diet as well as do cardio physical exercise regularly.  - COMP METABOLIC PANEL; Future  - LIPID PROFILE; Future    2. Microalbuminuria  - Improved.  Continue losartan 25 mg daily.  Advised to control blood sugar better.  - MICROALBUMIN CREAT RATIO URINE; Future    3. Essential hypertension  - Well-controlled. Continue current regimens, " losartan 25 mg daily. Recheck lab 1-2 weeks before next follow up visit.  - Reviewed the risks and benefits as well as potential side effects of medications with patient.  - Discussed to eat low-sodium diet and encouraged to do regular physical exercise.  - Recommend to monitor blood pressure and heart rate at home.  - COMP METABOLIC PANEL; Future    4. Controlled type 2 diabetes mellitus without complication, without long-term current use of insulin (HCC)  - A1c increased without medication.  Patient has never been treated with medication.  He agreed to try metformin 500 mg daily with largest meal once a day.  - Counseled to comply with medication and diet.   - Counseled signs and symptoms of hypoglycemia and management of hypoglycemia.   - Recommend to have retinal eye exam once a year.  - Advised to check both feet daily.  - Diabetic Monofilament Lower Extremity Exam  - COMP METABOLIC PANEL; Future  - HEMOGLOBIN A1C; Future  - MICROALBUMIN CREAT RATIO URINE; Future  - metFORMIN (GLUCOPHAGE) 500 MG Tab; Take 1 Tab by mouth every day. Take with meal.  Dispense: 90 Tab; Refill: 3    5. Health Maintenance   - Patient stated that he did not want to have any vaccines including influenza, Tdap, hepatitis B or shingles vaccine even after discussion of importance of immunization.  He stated that he might receive hepatitis B when he was in the  [employer].  Patient requested to exclude hepatitis B from his preventative list.      Followup: Return in about 3 months (around 2/1/2019), or if symptoms worsen or fail to improve, for Diabetes, Hypertension, Hyperlipidemia, Lab review.      Please note that this dictation was created using voice recognition software. I have made every reasonable attempt to correct obvious errors, but I expect that there may have unintended errors in text, spelling, punctuation, or grammar that I did not discover.

## 2018-11-01 NOTE — ASSESSMENT & PLAN NOTE
Patient tries to control his diabetes with diet and physical exercise.  His A1c is not at goal yet.  Recent A1c was 7.3 on 10/27/18.  I discussed with patient to try metformin 500 mg once a day with large meal.  I discussed potential side effects of metformin with patient.  He agreed to try metformin 500 mg once a day.

## 2018-11-01 NOTE — ASSESSMENT & PLAN NOTE
His blood pressure is well controlled with losartan.  He denied side effects from taking it.  I discussed blood test result with him in clinic today.

## 2018-11-01 NOTE — ASSESSMENT & PLAN NOTE
His microalbuminuria slightly improved in recent urine chemistry test.  He is taking losartan 25 mg daily.  His electrolytes and kidney functions are within normal.

## 2019-02-20 ENCOUNTER — HOSPITAL ENCOUNTER (OUTPATIENT)
Dept: LAB | Facility: MEDICAL CENTER | Age: 84
End: 2019-02-20
Attending: INTERNAL MEDICINE
Payer: MEDICARE

## 2019-02-20 DIAGNOSIS — I10 ESSENTIAL HYPERTENSION: ICD-10-CM

## 2019-02-20 DIAGNOSIS — R80.9 MICROALBUMINURIA: ICD-10-CM

## 2019-02-20 DIAGNOSIS — E78.00 PURE HYPERCHOLESTEROLEMIA: ICD-10-CM

## 2019-02-20 DIAGNOSIS — E11.9 CONTROLLED TYPE 2 DIABETES MELLITUS WITHOUT COMPLICATION, WITHOUT LONG-TERM CURRENT USE OF INSULIN (HCC): ICD-10-CM

## 2019-02-20 LAB
ALBUMIN SERPL BCP-MCNC: 4.5 G/DL (ref 3.2–4.9)
ALBUMIN/GLOB SERPL: 1.6 G/DL
ALP SERPL-CCNC: 54 U/L (ref 30–99)
ALT SERPL-CCNC: 20 U/L (ref 2–50)
ANION GAP SERPL CALC-SCNC: 6 MMOL/L (ref 0–11.9)
AST SERPL-CCNC: 17 U/L (ref 12–45)
BILIRUB SERPL-MCNC: 1.1 MG/DL (ref 0.1–1.5)
BUN SERPL-MCNC: 20 MG/DL (ref 8–22)
CALCIUM SERPL-MCNC: 10 MG/DL (ref 8.5–10.5)
CHLORIDE SERPL-SCNC: 103 MMOL/L (ref 96–112)
CHOLEST SERPL-MCNC: 131 MG/DL (ref 100–199)
CO2 SERPL-SCNC: 26 MMOL/L (ref 20–33)
CREAT SERPL-MCNC: 1.02 MG/DL (ref 0.5–1.4)
CREAT UR-MCNC: 95.7 MG/DL
EST. AVERAGE GLUCOSE BLD GHB EST-MCNC: 151 MG/DL
FASTING STATUS PATIENT QL REPORTED: NORMAL
GLOBULIN SER CALC-MCNC: 2.8 G/DL (ref 1.9–3.5)
GLUCOSE SERPL-MCNC: 125 MG/DL (ref 65–99)
HBA1C MFR BLD: 6.9 % (ref 0–5.6)
HDLC SERPL-MCNC: 53 MG/DL
LDLC SERPL CALC-MCNC: 59 MG/DL
MICROALBUMIN UR-MCNC: 19.3 MG/DL
MICROALBUMIN/CREAT UR: 202 MG/G (ref 0–30)
POTASSIUM SERPL-SCNC: 4.3 MMOL/L (ref 3.6–5.5)
PROT SERPL-MCNC: 7.3 G/DL (ref 6–8.2)
SODIUM SERPL-SCNC: 135 MMOL/L (ref 135–145)
TRIGL SERPL-MCNC: 95 MG/DL (ref 0–149)

## 2019-02-20 PROCEDURE — 80061 LIPID PANEL: CPT

## 2019-02-20 PROCEDURE — 80053 COMPREHEN METABOLIC PANEL: CPT

## 2019-02-20 PROCEDURE — 82570 ASSAY OF URINE CREATININE: CPT

## 2019-02-20 PROCEDURE — 36415 COLL VENOUS BLD VENIPUNCTURE: CPT

## 2019-02-20 PROCEDURE — 83036 HEMOGLOBIN GLYCOSYLATED A1C: CPT | Mod: GA

## 2019-02-20 PROCEDURE — 82043 UR ALBUMIN QUANTITATIVE: CPT

## 2019-02-25 ENCOUNTER — OFFICE VISIT (OUTPATIENT)
Dept: MEDICAL GROUP | Age: 84
End: 2019-02-25
Payer: MEDICARE

## 2019-02-25 VITALS
HEIGHT: 65 IN | BODY MASS INDEX: 28.26 KG/M2 | DIASTOLIC BLOOD PRESSURE: 80 MMHG | TEMPERATURE: 97.2 F | SYSTOLIC BLOOD PRESSURE: 134 MMHG | OXYGEN SATURATION: 95 % | HEART RATE: 81 BPM | WEIGHT: 169.6 LBS

## 2019-02-25 DIAGNOSIS — E78.00 PURE HYPERCHOLESTEROLEMIA: ICD-10-CM

## 2019-02-25 DIAGNOSIS — R80.9 POSITIVE FOR MACROALBUMINURIA: ICD-10-CM

## 2019-02-25 DIAGNOSIS — E11.9 CONTROLLED TYPE 2 DIABETES MELLITUS WITHOUT COMPLICATION, WITHOUT LONG-TERM CURRENT USE OF INSULIN (HCC): ICD-10-CM

## 2019-02-25 DIAGNOSIS — I10 ESSENTIAL HYPERTENSION: ICD-10-CM

## 2019-02-25 PROCEDURE — 99214 OFFICE O/P EST MOD 30 MIN: CPT | Performed by: INTERNAL MEDICINE

## 2019-02-25 ASSESSMENT — PATIENT HEALTH QUESTIONNAIRE - PHQ9: CLINICAL INTERPRETATION OF PHQ2 SCORE: 0

## 2019-02-25 NOTE — ASSESSMENT & PLAN NOTE
Patient has macroalbuminuria.  He is taking losartan 25 mg daily.  Patient reported that he is a little bit more meats and drinking more tea and alcohol.  He does not drink enough water.  I discussed with patient to increase the dose of losartan to 50 mg daily, but he wanted to keep the same dose of losartan.  He will try to increase water intake and avoid caffeine and alcohol.  Patient is advised to control his blood sugar and blood pressure very well to prevent progression of proteinuria.  He agreed with the plan.

## 2019-02-25 NOTE — ASSESSMENT & PLAN NOTE
His blood pressure is stable and well controlled.  He is taking losartan 25 mg daily.  He does not have side effects from taking losartan.

## 2019-02-25 NOTE — ASSESSMENT & PLAN NOTE
Patient restarted taking metformin 500 mg once a day in November 2018.  He tolerates Metformin without side effects.  His A1c improved to 6.9 on 2/20/19.  He denies hypoglycemia.  He agreed to continue metformin.

## 2019-02-25 NOTE — ASSESSMENT & PLAN NOTE
Patient reported that he has been eating a little bit more lately as he is traveling and has a lot of cruise trip.  He is taking atorvastatin 20 mg every evening.  He denies side effects from taking atorvastatin.  His cholesterol level is remaining controlled.  I reviewed blood test result with him in clinic today.    Results for MARC VARELA (MRN [id number]) as of 2/25/2019 12:36   Ref. Range 2/20/2019 08:10   Cholesterol,Tot Latest Ref Range: 100 - 199 mg/dL 131   Triglycerides Latest Ref Range: 0 - 149 mg/dL 95   HDL Latest Ref Range: >=40 mg/dL 53   LDL Latest Ref Range: <100 mg/dL 59

## 2019-02-25 NOTE — PROGRESS NOTES
Subjective:   Marc Varela is a 83 y.o. male here today for evaluation and management of:      Pure hypercholesterolemia  Patient reported that he has been eating a little bit more lately as he is traveling and has a lot of cruise trip.  He is taking atorvastatin 20 mg every evening.  He denies side effects from taking atorvastatin.  His cholesterol level is remaining controlled.  I reviewed blood test result with him in clinic today.    Results for MARC VARELA (MRN [id number]) as of 2/25/2019 12:36   Ref. Range 2/20/2019 08:10   Cholesterol,Tot Latest Ref Range: 100 - 199 mg/dL 131   Triglycerides Latest Ref Range: 0 - 149 mg/dL 95   HDL Latest Ref Range: >=40 mg/dL 53   LDL Latest Ref Range: <100 mg/dL 59       Positive for macroalbuminuria  Patient has macroalbuminuria.  He is taking losartan 25 mg daily.  Patient reported that he is a little bit more meats and drinking more tea and alcohol.  He does not drink enough water.  I discussed with patient to increase the dose of losartan to 50 mg daily, but he wanted to keep the same dose of losartan.  He will try to increase water intake and avoid caffeine and alcohol.  Patient is advised to control his blood sugar and blood pressure very well to prevent progression of proteinuria.  He agreed with the plan.    Essential hypertension  His blood pressure is stable and well controlled.  He is taking losartan 25 mg daily.  He does not have side effects from taking losartan.    Controlled type 2 diabetes mellitus without complication, without long-term current use of insulin (CMS-[facility])  Patient restarted taking metformin 500 mg once a day in November 2018.  He tolerates Metformin without side effects.  His A1c improved to 6.9 on 2/20/19.  He denies hypoglycemia.  He agreed to continue metformin.         Current medicines (including changes today)  Current Outpatient Prescriptions   Medication Sig Dispense Refill   • metFORMIN (GLUCOPHAGE) 500 MG Tab Take 1 Tab by mouth every  "day. Take with meal. 90 Tab 3   • losartan (COZAAR) 25 MG Tab Take 1 Tab by mouth every day. 90 Tab 3   • atorvastatin (LIPITOR) 20 MG Tab Take 1 Tab by mouth every day. 90 Tab 3   • aspirin EC (ECOTRIN) 81 MG TBEC Take 81 mg by mouth every day.       No current facility-administered medications for this visit.      He  has a past medical history of Diabetes mellitus type 2, controlled ([facility]) (12/3/2014); Hearing loss of right ear (5/29/2014); Hyperlipidemia (5/29/2014); Hypertension (5/29/2014); Metabolic syndrome (5/29/2014); and Pain in ankle (5/29/2014).    ROS   No chest pain, no shortness of breath, no abdominal pain       Objective:     Blood pressure 134/80, pulse 81, temperature 36.2 °C (97.2 °F), temperature source Temporal, height 1.651 m (5' 5\"), weight 76.9 kg (169 lb 9.6 oz), SpO2 95 %. Body mass index is 28.22 kg/m².   Physical Exam:  General: Alert, oriented and no acute distress.  Eye contact is good, speech goal directed, affect calm  HEENT: conjunctiva non-injected, sclera non-icteric.  Oral mucous membranes pink and moist with no lesions.  Pinna normal.   Lungs: Normal respiratory effort, clear to auscultation bilaterally with good excursion.  CV: regular rate and rhythm. No murmurs.   Abdomen: soft, non distended, nontender, Bowel sound normal.  Ext: no edema, color normal, vascularity normal, temperature normal        Assessment and Plan:   The following treatment plan was discussed     1. Pure hypercholesterolemia  - Well-controlled. Continue current regimens, atorvastatin 20 mg every evening. Recheck lab 1-2 weeks before next follow up visit.  - Reviewed the risks and benefits of treatment and potential side effects of medication.  - Advised to eat low fat, low carbohydrate and high fiber diet as well as do cardio physical exercise regularly.  - Comp Metabolic Panel; Future  - Lipid Profile; Future    2. Controlled type 2 diabetes mellitus without complication, without long-term current use of " insulin (HCC)  - Well-controlled. Continue current regimens, metformin 500 mg daily with meal. Recheck lab 1-2 weeks before next follow up visit.  - Counseled to comply with medication and diet.   - Counseled signs and symptoms of hypoglycemia and management of hypoglycemia.   - Recommend to have retinal eye exam once a year.  - Advised to check both feet daily.  - Comp Metabolic Panel; Future  - HEMOGLOBIN A1C; Future  - MICROALBUMIN CREAT RATIO URINE; Future    3. Essential hypertension  - Well-controlled. Continue current regimens, losartan 25 mg daily. Recheck lab 1-2 weeks before next follow up visit.  - Reviewed the risks and benefits as well as potential side effects of medications with patient.  - Discussed to eat low-sodium diet and encouraged to do regular physical exercise.  - Recommend to monitor blood pressure and heart rate at home.  - Comp Metabolic Panel; Future    4. Positive for macroalbuminuria  - Not well controlled yet.  Discussed to increase the dose of losartan to 50 mg daily.  However patient declined to increase the dose of losartan.  He wanted to change his diet, avoid drinking excessive caffeine, tea or alcohol.  He will increase water intake.  He is advised to control his diabetes and blood pressure very well to prevent progression of proteinuria and kidney disease.  - MICROALBUMIN CREAT RATIO URINE; Future    5. Health Maintenance   - Patient declined to receive influenza vaccine, shingles vaccine and Tdap vaccine even after discussion of importance of immunization.      Followup: Return in about 6 months (around 8/25/2019), or if symptoms worsen or fail to improve, for Diabetes, Hypertension, Hyperlipidemia, macroalbuminuria, Lab review.      Please note that this dictation was created using voice recognition software. I have made every reasonable attempt to correct obvious errors, but I expect that there may have unintended errors in text, spelling, punctuation, or grammar that I did  not discover.

## 2019-07-16 DIAGNOSIS — I10 ESSENTIAL HYPERTENSION: ICD-10-CM

## 2019-07-16 DIAGNOSIS — E78.00 PURE HYPERCHOLESTEROLEMIA: ICD-10-CM

## 2019-07-16 RX ORDER — LOSARTAN POTASSIUM 25 MG/1
TABLET ORAL
Qty: 90 TAB | Refills: 3 | Status: SHIPPED | OUTPATIENT
Start: 2019-07-16 | End: 2020-07-06

## 2019-07-16 RX ORDER — ATORVASTATIN CALCIUM 20 MG/1
TABLET, FILM COATED ORAL
Qty: 90 TAB | Refills: 3 | Status: SHIPPED | OUTPATIENT
Start: 2019-07-16 | End: 2020-07-06

## 2019-08-29 ENCOUNTER — HOSPITAL ENCOUNTER (OUTPATIENT)
Dept: LAB | Facility: MEDICAL CENTER | Age: 84
End: 2019-08-29
Attending: INTERNAL MEDICINE
Payer: MEDICARE

## 2019-08-29 DIAGNOSIS — E11.9 CONTROLLED TYPE 2 DIABETES MELLITUS WITHOUT COMPLICATION, WITHOUT LONG-TERM CURRENT USE OF INSULIN (HCC): ICD-10-CM

## 2019-08-29 DIAGNOSIS — R80.9 POSITIVE FOR MACROALBUMINURIA: ICD-10-CM

## 2019-08-29 DIAGNOSIS — E78.00 PURE HYPERCHOLESTEROLEMIA: ICD-10-CM

## 2019-08-29 DIAGNOSIS — I10 ESSENTIAL HYPERTENSION: ICD-10-CM

## 2019-08-29 LAB
ALBUMIN SERPL BCP-MCNC: 4.1 G/DL (ref 3.2–4.9)
ALBUMIN/GLOB SERPL: 1.3 G/DL
ALP SERPL-CCNC: 48 U/L (ref 30–99)
ALT SERPL-CCNC: 17 U/L (ref 2–50)
ANION GAP SERPL CALC-SCNC: 8 MMOL/L (ref 0–11.9)
AST SERPL-CCNC: 17 U/L (ref 12–45)
BILIRUB SERPL-MCNC: 0.9 MG/DL (ref 0.1–1.5)
BUN SERPL-MCNC: 19 MG/DL (ref 8–22)
CALCIUM SERPL-MCNC: 9.5 MG/DL (ref 8.5–10.5)
CHLORIDE SERPL-SCNC: 105 MMOL/L (ref 96–112)
CHOLEST SERPL-MCNC: 112 MG/DL (ref 100–199)
CO2 SERPL-SCNC: 26 MMOL/L (ref 20–33)
CREAT SERPL-MCNC: 1.04 MG/DL (ref 0.5–1.4)
CREAT UR-MCNC: 97.9 MG/DL
EST. AVERAGE GLUCOSE BLD GHB EST-MCNC: 148 MG/DL
FASTING STATUS PATIENT QL REPORTED: NORMAL
GLOBULIN SER CALC-MCNC: 3.1 G/DL (ref 1.9–3.5)
GLUCOSE SERPL-MCNC: 128 MG/DL (ref 65–99)
HBA1C MFR BLD: 6.8 % (ref 0–5.6)
HDLC SERPL-MCNC: 46 MG/DL
LDLC SERPL CALC-MCNC: 49 MG/DL
MICROALBUMIN UR-MCNC: 10 MG/DL
MICROALBUMIN/CREAT UR: 102 MG/G (ref 0–30)
POTASSIUM SERPL-SCNC: 4.3 MMOL/L (ref 3.6–5.5)
PROT SERPL-MCNC: 7.2 G/DL (ref 6–8.2)
SODIUM SERPL-SCNC: 139 MMOL/L (ref 135–145)
TRIGL SERPL-MCNC: 87 MG/DL (ref 0–149)

## 2019-08-29 PROCEDURE — 80061 LIPID PANEL: CPT

## 2019-08-29 PROCEDURE — 82043 UR ALBUMIN QUANTITATIVE: CPT

## 2019-08-29 PROCEDURE — 36415 COLL VENOUS BLD VENIPUNCTURE: CPT

## 2019-08-29 PROCEDURE — 82570 ASSAY OF URINE CREATININE: CPT

## 2019-08-29 PROCEDURE — 83036 HEMOGLOBIN GLYCOSYLATED A1C: CPT | Mod: GA

## 2019-08-29 PROCEDURE — 80053 COMPREHEN METABOLIC PANEL: CPT

## 2019-09-04 ENCOUNTER — OFFICE VISIT (OUTPATIENT)
Dept: MEDICAL GROUP | Age: 84
End: 2019-09-04
Payer: MEDICARE

## 2019-09-04 VITALS
BODY MASS INDEX: 28.36 KG/M2 | SYSTOLIC BLOOD PRESSURE: 122 MMHG | DIASTOLIC BLOOD PRESSURE: 64 MMHG | TEMPERATURE: 99 F | HEART RATE: 66 BPM | WEIGHT: 170.2 LBS | HEIGHT: 65 IN | OXYGEN SATURATION: 94 %

## 2019-09-04 DIAGNOSIS — E78.00 PURE HYPERCHOLESTEROLEMIA: ICD-10-CM

## 2019-09-04 DIAGNOSIS — E11.9 CONTROLLED TYPE 2 DIABETES MELLITUS WITHOUT COMPLICATION, WITHOUT LONG-TERM CURRENT USE OF INSULIN (HCC): ICD-10-CM

## 2019-09-04 DIAGNOSIS — R80.9 POSITIVE FOR MACROALBUMINURIA: ICD-10-CM

## 2019-09-04 DIAGNOSIS — R25.2 LEG CRAMPS: ICD-10-CM

## 2019-09-04 DIAGNOSIS — I10 ESSENTIAL HYPERTENSION: ICD-10-CM

## 2019-09-04 PROCEDURE — 99214 OFFICE O/P EST MOD 30 MIN: CPT | Performed by: INTERNAL MEDICINE

## 2019-09-04 SDOH — HEALTH STABILITY: MENTAL HEALTH: HOW MANY STANDARD DRINKS CONTAINING ALCOHOL DO YOU HAVE ON A TYPICAL DAY?: 1 OR 2

## 2019-09-04 SDOH — HEALTH STABILITY: MENTAL HEALTH: HOW OFTEN DO YOU HAVE 6 OR MORE DRINKS ON ONE OCCASION?: NEVER

## 2019-09-04 SDOH — HEALTH STABILITY: MENTAL HEALTH: HOW OFTEN DO YOU HAVE A DRINK CONTAINING ALCOHOL?: 4 OR MORE TIMES A WEEK

## 2019-09-04 ASSESSMENT — PAIN SCALES - GENERAL: PAINLEVEL: NO PAIN

## 2019-09-04 ASSESSMENT — ENCOUNTER SYMPTOMS: GENERAL WELL-BEING: GOOD

## 2019-09-04 ASSESSMENT — ACTIVITIES OF DAILY LIVING (ADL): BATHING_REQUIRES_ASSISTANCE: 0

## 2019-09-04 ASSESSMENT — PATIENT HEALTH QUESTIONNAIRE - PHQ9: CLINICAL INTERPRETATION OF PHQ2 SCORE: 0

## 2019-09-04 NOTE — LETTER
[employer]  Afua Pinon M.D.  [address]  [place] [address]  Fax: [phone]   Authorization for Release/Disclosure of   Protected Health Information   Name: MARC VALLE : 1935 SSN: [id number]   Address: [facility] [address]  [place] [address] Phone:    [phone] (home) [phone] (work)   I authorize the entity listed below to release/disclose the PHI below to:   [employer]/Afua Pinon M.D. and Afua Pinon M.D.   Provider or Entity Name:  [facility]   Address   City, State, [facility]   Phone:      Fax:     Reason for request: continuity of care   Information to be released:    [  ] LAST COLONOSCOPY,  including any PATH REPORT and follow-up  [  ] LAST FIT/COLOGUARD RESULT [  ] LAST DEXA  [  ] LAST MAMMOGRAM  [  ] LAST PAP  [  ] LAST LABS [XXX] RETINA EXAM REPORT  [  ] IMMUNIZATION RECORDS  [  ] Release all info      [  ] Check here and initial the line next to each item to release ALL health information INCLUDING  _____ Care and treatment for drug and / or alcohol abuse  _____ HIV testing, infection status, or AIDS  _____ Genetic Testing    DATES OF SERVICE OR TIME PERIOD TO BE DISCLOSED: _____________  I understand and acknowledge that:  * This Authorization may be revoked at any time by you in writing, except if your health information has already been used or disclosed.  * Your health information that will be used or disclosed as a result of you signing this authorization could be re-disclosed by the recipient. If this occurs, your re-disclosed health information may no longer be protected by State or Federal laws.  * You may refuse to sign this Authorization. Your refusal will not affect your ability to obtain treatment.  * This Authorization becomes effective upon signing and will  on (date) __________.      If no date is indicated, this Authorization will  one (1) year from the signature date.    Name: Marc Valle    Signature:   Date:     2019       PLEASE FAX  REQUESTED RECORDS BACK TO: [phone]

## 2019-09-04 NOTE — PROGRESS NOTES
Subjective:   Marc Varela is a 83 y.o. male here today for evaluation and management of:      Pure hypercholesterolemia  Chronic. Patient reports compliancy with atorvastatin 20 mg QN and denies any side effects. He additionally takes a daily baby aspirin without any reported side effects. We reviewed recent blood work from 8/29/19 in clinic today which showed improvement however HDL did decrease to 46.    Results for MARC VARELA (MRN [id number]) as of 9/4/2019 11:05   Ref. Range 2/20/2019 08:10 8/29/2019 08:13   Cholesterol,Tot Latest Ref Range: 100 - 199 mg/dL 131 112   Triglycerides Latest Ref Range: 0 - 149 mg/dL 95 87   HDL Latest Ref Range: >=40 mg/dL 53 46   LDL Latest Ref Range: <100 mg/dL 59 49       Essential hypertension  Chronic. Patient reports compliancy with losartan 25 mg QD and denies any side effects. BP in clinic today was 122/64 and he denies any chest pain, shortness of breath, or headache.  He has normal electrolytes and kidney function on 8/29/2019.    Controlled type 2 diabetes mellitus without complication, without long-term current use of insulin (HCC)  Chronic. Patient reports compliancy with metformin 500 mg QD with meal and denies any side effects. We reviewed recent blood work from 8/29/19 in clinic today which showed stability with improved A1c at 6.8. Patient denies any episodes of hypoglycemia. He denies any claudication or lower extremity numbness, weakness, tingling. He does complete daily feet checks.    Results for MARC VARELA (MRN [id number]) as of 9/4/2019 11:05   Ref. Range 2/20/2019 08:10 8/29/2019 08:13   Glucose Latest Ref Range: 65 - 99 mg/dL 125 (H) 128 (H)   Glycohemoglobin Latest Ref Range: 0.0 - 5.6 % 6.9 (H) 6.8 (H)   Estim. Avg Glu Latest Units: mg/dL 151 148       Positive for macroalbuminuria  Chronic. Patient reports compliancy with losartan 25 mg QD and denies any side effects. At previous visit on 2/25/19 I had recommend he increase to losartan 50 mg QD due to poor  "control on macroalbuminuria, however at that time he requested to modify his diet and maintain current dose. He has had significant improvement with microalbumin creatine ratio down to 102 from 202 on 02/20/19. 102 is still an elevated level however he declines medication increase at this time, electing to modify diet.    Results for MARC VARELA (MRN [id number]) as of 9/4/2019 11:05   Ref. Range 2/20/2019 08:10 8/29/2019 08:13   Micro Alb Creat Ratio Latest Ref Range: 0 - 30 mg/g 202 (H) 102 (H)   Creatinine, Urine Latest Units: mg/dL 95.70 97.90   Microalbumin, Urine Random Latest Units: mg/dL 19.3 10.0       Leg cramps  Chronic. Patient reports that a few times during 07/2019 he woke up with right lower extremity cramping. He denies ongoing or current symptoms and reports symptoms have not recently recurred. He denies any recent injury and he occasionally exercises.       Current medicines (including changes today)  Current Outpatient Medications   Medication Sig Dispense Refill   • atorvastatin (LIPITOR) 20 MG Tab TAKE ONE TABLET BY MOUTH ONE TIME DAILY  90 Tab 3   • losartan (COZAAR) 25 MG Tab TAKE ONE TABLET BY MOUTH ONE TIME DAILY  90 Tab 3   • metFORMIN (GLUCOPHAGE) 500 MG Tab Take 1 Tab by mouth every day. Take with meal. 90 Tab 3   • aspirin EC (ECOTRIN) 81 MG TBEC Take 81 mg by mouth every day.       No current facility-administered medications for this visit.      He  has a past medical history of Diabetes mellitus type 2, controlled ([facility]) (12/3/2014), Hearing loss of right ear (5/29/2014), Hyperlipidemia (5/29/2014), Hypertension (5/29/2014), Metabolic syndrome (5/29/2014), and Pain in ankle (5/29/2014).    ROS   No chest pain, no shortness of breath, no abdominal pain       Objective:     /64 (BP Location: Right arm, Patient Position: Sitting, BP Cuff Size: Adult)   Pulse 66   Temp 37.2 °C (99 °F) (Temporal)   Ht 1.641 m (5' 4.61\")   Wt 77.2 kg (170 lb 3.2 oz)   SpO2 94%  Body mass index is " 28.67 kg/m².   Physical Exam:  General: Alert, oriented and no acute distress.  Eye contact is good, speech goal directed, affect calm  HEENT: conjunctiva non-injected, sclera non-icteric.  Oral mucous membranes pink and moist with no lesions.  Pinna normal.  Lungs: Normal respiratory effort, clear to auscultation bilaterally with good excursion.  CV: regular rate and rhythm. No murmurs.   Abdomen: soft, non distended, nontender, No CVAT, Bowel sound normal.  Ext: no edema, color normal, vascularity normal, temperature normal. Diabetic monofilament feet exam completed and normal today. No open sores appreciated. Normal pedal pulses bilaterally.  Musculoskeletal exam: moving all extremities freely    Diabetic foot exam  Monofilament testing with a 10 gram force: sensation: intact bilaterally  Visual Inspection: Feet without maceration, ulcers, or fissures.  Pedal pulses: intact bilaterally      Assessment and Plan:   The following treatment plan was discussed     1. Pure hypercholesterolemia  - Well-controlled. Continue current regimens, atorvastatin 20 mg every evening. Recheck lab 1-2 weeks before next follow up visit.  - Reviewed the risks and benefits of treatment and potential side effects of medication.  - Advised to eat low fat, low carbohydrate and high fiber diet as well as do cardio physical exercise regularly.  - Comp Metabolic Panel; Future  - Lipid Profile; Future    2. Essential hypertension  - Well-controlled. Continue current regimens, losartan 25 mg daily. Recheck lab 1-2 weeks before next follow up visit.  - Reviewed the risks and benefits as well as potential side effects of medications with patient.  - Discussed to eat low-sodium diet and encouraged to do regular physical exercise.  - Recommend to monitor blood pressure and heart rate at home.  - CBC WITH DIFFERENTIAL; Future  - Comp Metabolic Panel; Future    3. Controlled type 2 diabetes mellitus without complication, without long-term current  use of insulin (HCC)  - Well-controlled. Continue current regimens, metformin 500 mg daily with meal. Recheck lab 1-2 weeks before next follow up visit.  - Counseled to comply with medication and diet.   - Counseled signs and symptoms of hypoglycemia and management of hypoglycemia.   - Recommend to have retinal eye exam once a year.  Patient has appointment with [facility] eye associate on 9/24/2019 for retinal exam.  - Advised to check both feet daily.  - Comp Metabolic Panel; Future  - HEMOGLOBIN A1C; Future  - MICROALBUMIN CREAT RATIO URINE; Future  - Diabetic Monofilament Lower Extremity Exam    4. Positive for macroalbuminuria  - Improvement from last visit in 02/2019. At this time he continues to have elevated microalbumin creatine ratio down to 102 from 202 on 02/20/19. Discussed to increase the dose of losartan to 50 mg daily, however patient declined to increase the dose of losartan. He wanted to change his diet, avoid drinking excessive caffeine, tea or alcohol. He was advised to decrease intake of red meat.  He will eat more white meat. He will increase water intake. He is advised to control his diabetes and blood pressure very well to prevent progression of proteinuria and kidney disease.  - Plan to recheck with labs prior to next follow up visit.  - MICROALBUMIN CREAT RATIO URINE; Future    5. Leg cramps  - Due to lack of recurrence patient has agreed to conservative treatment and will continue to monitor. He was advised to drink lots of water and regularly stretch.    6. Health Maintenance  - Patient has elected to postpone shingles, tetanus, and flu vaccines after thorough discussion of risks and benefits.    Follow up: Return in about 6 months (around 3/4/2020), or if symptoms worsen or fail to improve, for Hyperlipidemia, Hypertension, Diabetes, leg cramps, macroalbuminuria, Lab review.    Ilana WAGGONER (Scribe), am scribing for, and in the presence of, Afua Pinon M.D..  ?  Electronically signed  by: Ilana Bacon (Scribe), 9/04/2019  ?  I, Afua Pinon M.D., personally performed the services described in this documentation, as scribed by Ilana Bacon in my presence, and it is both accurate and complete.    Please note that this dictation was created using voice recognition software. I have made every reasonable attempt to correct obvious errors, but I expect that there may have unintended errors in text, spelling, punctuation, or grammar that I did not discover.

## 2019-09-24 ENCOUNTER — TELEPHONE (OUTPATIENT)
Dept: MEDICAL GROUP | Age: 84
End: 2019-09-24

## 2019-09-24 NOTE — TELEPHONE ENCOUNTER
VOICEMAIL  1. Caller Name: Jim Valle                        Call Back Number: [phone] (home) [phone] (work)      2. Message: Pt lvm stating on television he saw that there is a recall on his medication losartan (COZAAR) & that it causes cancer. Pt request additional educating information.        3. Patient approves office to leave a detailed voicemail/MyChart message: N\A    Dr. Pinon please advise.

## 2019-09-24 NOTE — TELEPHONE ENCOUNTER
Please call to inform patient not all the losartan is under recall.  He can check with his pharmacy to see his losartan is under recall or not.  Pharmacy has the recalled log number.    Pharmacy usually calls to inform patient to switch the new medication, losartan if his batch is under recall.    If he still concerns to take losartan, then we can switch it to different medication lisinopril.  Please let me know if he wants to switch to lisinopril.    Afua Pinon M.D.

## 2019-10-01 NOTE — TELEPHONE ENCOUNTER
"VOICEMAIL  1. Caller Name: Jim Valle                        Call Back Number: [phone] (home) [phone] (work)      2. Message: Pt lvm. Returned call & spoke to pt. Advised per Dr. Pinon:     \"Not all Losartan is under recall.  He can check with his pharmacy to see if his Losartan is under recall or not.  Pharmacy has the recalled log number.     Pharmacy usually calls to inform patient to switch the new medication, losartan if his batch is under recall.     If still concerned to take losartan, then he can be switched to the different medication lisinopril.\"    Pt stated he will continue to take Losartan & contact his pharmacy.     Per pt request rescheduled his appointment to 12/12/19 @ 11:00AM.  Pt wants to see Dr. Pinon before she's unavailable for the year 2020.    3. Patient approves office to leave a detailed voicemail/MyChart message: N\A    "

## 2019-10-19 DIAGNOSIS — E11.9 CONTROLLED TYPE 2 DIABETES MELLITUS WITHOUT COMPLICATION, WITHOUT LONG-TERM CURRENT USE OF INSULIN (HCC): ICD-10-CM

## 2019-12-12 ENCOUNTER — OFFICE VISIT (OUTPATIENT)
Dept: MEDICAL GROUP | Age: 84
End: 2019-12-12
Payer: MEDICARE

## 2019-12-12 VITALS
TEMPERATURE: 97.9 F | WEIGHT: 175.4 LBS | DIASTOLIC BLOOD PRESSURE: 84 MMHG | HEART RATE: 86 BPM | BODY MASS INDEX: 29.22 KG/M2 | SYSTOLIC BLOOD PRESSURE: 124 MMHG | OXYGEN SATURATION: 96 % | HEIGHT: 65 IN

## 2019-12-12 DIAGNOSIS — E11.9 CONTROLLED TYPE 2 DIABETES MELLITUS WITHOUT COMPLICATION, WITHOUT LONG-TERM CURRENT USE OF INSULIN (HCC): ICD-10-CM

## 2019-12-12 DIAGNOSIS — I10 ESSENTIAL HYPERTENSION: ICD-10-CM

## 2019-12-12 DIAGNOSIS — M70.22 OLECRANON BURSITIS OF LEFT ELBOW: ICD-10-CM

## 2019-12-12 DIAGNOSIS — E78.00 PURE HYPERCHOLESTEROLEMIA: ICD-10-CM

## 2019-12-12 PROCEDURE — 99214 OFFICE O/P EST MOD 30 MIN: CPT | Performed by: INTERNAL MEDICINE

## 2019-12-12 SDOH — HEALTH STABILITY: MENTAL HEALTH: HOW OFTEN DO YOU HAVE 6 OR MORE DRINKS ON ONE OCCASION?: DAILY OR ALMOST DAILY

## 2019-12-12 ASSESSMENT — PAIN SCALES - GENERAL: PAINLEVEL: NO PAIN

## 2019-12-12 NOTE — LETTER
[employer]  Afua Pinon M.D.  [address]  [place] [address]  Fax: [phone]   Authorization for Release/Disclosure of   Protected Health Information   Name: MARC VALLE : 1935 SSN: [id number]   Address: [facility] [address]  [place] [address] Phone:    [phone] (home) [phone] (work)   I authorize the entity listed below to release/disclose the PHI below to:   [employer]/Afua Pinon M.D. and Afua Pinon M.D.   Provider or Entity Name:[facility]     Address   City, [facility]   Phone:      Fax:[phone]     Reason for request: continuity of care   Information to be released:    [  ] LAST COLONOSCOPY,  including any PATH REPORT and follow-up  [  ] LAST FIT/COLOGUARD RESULT [  ] LAST DEXA  [  ] LAST MAMMOGRAM  [  ] LAST PAP  [  ] LAST LABS [XX ] RETINA EXAM REPORT  [  ] IMMUNIZATION RECORDS  [  ] Release all info      [  ] Check here and initial the line next to each item to release ALL health information INCLUDING  _____ Care and treatment for drug and / or alcohol abuse  _____ HIV testing, infection status, or AIDS  _____ Genetic Testing    DATES OF SERVICE OR TIME PERIOD TO BE DISCLOSED: _____________  I understand and acknowledge that:  * This Authorization may be revoked at any time by you in writing, except if your health information has already been used or disclosed.  * Your health information that will be used or disclosed as a result of you signing this authorization could be re-disclosed by the recipient. If this occurs, your re-disclosed health information may no longer be protected by State or Federal laws.  * You may refuse to sign this Authorization. Your refusal will not affect your ability to obtain treatment.  * This Authorization becomes effective upon signing and will  on (date) __________.      If no date is indicated, this Authorization will  one (1) year from the signature date.    Name: Marc Valle    Signature:   Date:     2019            PLEASE FAX REQUESTED RECORDS BACK TO: [phone]   PRE-OP DIAGNOSIS:  Recurrent cystitis 20-Jun-2019 11:21:41  Jurgen Walsh  Recurrent cystitis 20-Jun-2019 11:21:29  Jurgen Walsh

## 2019-12-12 NOTE — PROGRESS NOTES
Subjective:   Marc Varela is a 84 y.o. male here today for evaluation and management of:    Essential hypertension  Chronic in nature and stable. Monitoring blood pressure at home. Patient continues to take losartan 25 mg daily as prescribed. No reports of medications side effects including lightheadedness, vision changes, headache, palpitations, or leg swelling. Patient continues to manage with diet and exercise. Blood pressure in office today is 124/84.     Controlled type 2 diabetes mellitus without complication, without long-term current use of insulin (HCC)  Chronic in nature and stable. Patient continues to take metformin 500 mg daily. No reports of medication side effects at this time. Patient continues to manage with diet and exercise. Diabetes is well controlled with a blood glucose of 128 and an A1C of 6.8 on 8/29/10. Patient notes his last retinal exam was 2 days ago.    Results for MARC VARELA (MRN [id number]) as of 12/12/2019 11:20   Ref. Range 8/29/2019 08:13   Glucose Latest Ref Range: 65 - 99 mg/dL 128 (H)   Glycohemoglobin Latest Ref Range: 0.0 - 5.6 % 6.8 (H)     Pure hypercholesterolemia  Chronic in nature and stable. Patient has been taking atorvastatin 20 mg daily as prescribed without side effects including myalgias, abdominal pain, dizziness, claudication, or chest pain. Continues to manage with diet and exercise. Blood work was done on 8/29/19 indicated patient's cholesterol levels were within normal limits.     Results for MARC VARELA (MRN [id number]) as of 12/12/2019 11:20   Ref. Range 8/29/2019 08:13   Cholesterol,Tot Latest Ref Range: 100 - 199 mg/dL 112   Triglycerides Latest Ref Range: 0 - 149 mg/dL 87   HDL Latest Ref Range: >=40 mg/dL 46   LDL Latest Ref Range: <100 mg/dL 49     Olecranon bursitis of left elbow  This is an acute issue. Patient is currently complaining of non painful bump on left elbow that he noticed approximately 2 months ago. No alleviating or exacerbating factors noted.   "Patient reported that he plays [employer] and have repetitive movement on his left elbow.  He denies injury or trauma to his elbows.    Current medicines (including changes today)  Current Outpatient Medications   Medication Sig Dispense Refill   • metFORMIN (GLUCOPHAGE) 500 MG Tab TAKE ONE TABLET BY MOUTH ONE TIME DAILY WITH FOOD 90 Tab 3   • atorvastatin (LIPITOR) 20 MG Tab TAKE ONE TABLET BY MOUTH ONE TIME DAILY  90 Tab 3   • losartan (COZAAR) 25 MG Tab TAKE ONE TABLET BY MOUTH ONE TIME DAILY  90 Tab 3   • aspirin EC (ECOTRIN) 81 MG TBEC Take 81 mg by mouth every day.       No current facility-administered medications for this visit.      He  has a past medical history of Diabetes mellitus type 2, controlled ([facility]) (12/3/2014), Hearing loss of right ear (5/29/2014), Hyperlipidemia (5/29/2014), Hypertension (5/29/2014), Metabolic syndrome (5/29/2014), and Pain in ankle (5/29/2014).    ROS   No chest pain, no shortness of breath, no abdominal pain, no bloody stools.       Objective:     /84 (BP Location: Left arm, Patient Position: Sitting, BP Cuff Size: Adult)   Pulse 86   Temp 36.6 °C (97.9 °F)   Ht 1.651 m (5' 5\")   Wt 79.6 kg (175 lb 6.4 oz)   SpO2 96%  Body mass index is 29.19 kg/m².   Physical Exam:  General: Alert, oriented and no acute distress.  Eye contact is good, speech goal directed, affect calm  HEENT: conjunctiva non-injected, sclera non-icteric.  Oral mucous membranes pink and moist with no lesions.  Pinna normal.  Lungs: Normal respiratory effort, clear to auscultation bilaterally with good excursion.  CV: regular rate and rhythm. No murmurs.   Abdomen: soft, non distended, nontender, No CVAT, Bowel sound normal.  Ext: no edema, color normal, vascularity normal, temperature normal  Musculoskeletal exam: Swelling on left olecranon bursa, nontender on palpation.  Range of movement of left elbow within normal.      Assessment and Plan:   The following treatment plan was discussed     1. " Essential hypertension  - Well-controlled. Continue current regimens of losartan 25 mg daily. Recheck lab 1-2 weeks before next follow up visit.  - Reviewed the risks and benefits as well as potential side effects of medications with patient.  - Discussed to eat low-sodium diet and encouraged to do regular physical exercise.  - Recommend to monitor blood pressure and heart rate at home.    2. Controlled type 2 diabetes mellitus without complication, without long-term current use of insulin (HCC)  - Counseled to comply with metformin 500 mg once daily and diet.   - Counseled signs and symptoms of hypoglycemia and management of hypoglycemia.   - Recommend to have retinal eye exam once a year.  - Advised to check both feet daily.   - Blood tests were already ordered in previous visit and patient is advised to complete fasting blood tests before next appointment.    3. Pure hypercholesterolemia  - Well-controlled. Continue current regimens of atorvastatin 20 mg once daily. Recheck lab 1-2 weeks before next follow up visit.  - Reviewed the risks and benefits of treatment and potential side effects of medication.  - Advised to eat low fat, low carbohydrate and high fiber diet as well as do cardio physical exercise regularly.     4. Olecranon bursitis of left elbow  - Informed patient the bump may be related to bursitis.   - Advised patient to discontinue repeatitive movements, ice elbow, and use an elbow brace.   - Informed patient he can take ibuprofen if the bump gets painful, however, I advised against regular use due to diabetes and potential kidney issues.   - Reviewed the risks and benefits as well as potential side effects of medications with patient.    5. Health Maintenance   - Patient is due for annual wellness visit and advised him to complete annual wellness visit with new PCP.  Patient had retinal eye exam 2 days ago and we will obtain exam report from ophthalmologist.  Patient declined to receive influenza  vaccine, shingle vaccine and Tdap vaccine even after discussion of risks and benefits of immunization.    Followup: Return in about 3 months (around 3/12/2020), or if symptoms worsen or fail to improve, for Hypertension, Hyperlipidemia, Diabetes, olecranon bursitis of left elbow.      Please note that this dictation was created using voice recognition software. I have made every reasonable attempt to correct obvious errors, but I expect that there may have unintended errors in text, spelling, punctuation, or grammar that I did not discover.           I, Lindy Rivas (Doug), am scribing for, and in the presence of, Afua Pinon M.D.. Electronically signed by: Lindy Rivas (Doug), 12/12/19.  I, Afua Pinon M.D., personally performed the services described in this documentation, as scribed by Lindy Rivas in my presence, and it is both accurate and complete.

## 2019-12-30 ENCOUNTER — OFFICE VISIT (OUTPATIENT)
Dept: MEDICAL GROUP | Facility: MEDICAL CENTER | Age: 84
End: 2019-12-30
Payer: MEDICARE

## 2019-12-30 VITALS
WEIGHT: 174.82 LBS | DIASTOLIC BLOOD PRESSURE: 76 MMHG | SYSTOLIC BLOOD PRESSURE: 142 MMHG | TEMPERATURE: 97.5 F | BODY MASS INDEX: 29.13 KG/M2 | HEIGHT: 65 IN | OXYGEN SATURATION: 95 % | HEART RATE: 86 BPM

## 2019-12-30 DIAGNOSIS — E11.59 HYPERTENSION COMPLICATING DIABETES (HCC): ICD-10-CM

## 2019-12-30 DIAGNOSIS — E11.69 HYPERLIPIDEMIA ASSOCIATED WITH TYPE 2 DIABETES MELLITUS (HCC): ICD-10-CM

## 2019-12-30 DIAGNOSIS — E78.5 HYPERLIPIDEMIA ASSOCIATED WITH TYPE 2 DIABETES MELLITUS (HCC): ICD-10-CM

## 2019-12-30 DIAGNOSIS — H90.11 CONDUCTIVE HEARING LOSS OF RIGHT EAR WITH UNRESTRICTED HEARING OF LEFT EAR: ICD-10-CM

## 2019-12-30 DIAGNOSIS — R80.9 POSITIVE FOR MACROALBUMINURIA: ICD-10-CM

## 2019-12-30 DIAGNOSIS — Z86.19 HISTORY OF SHINGLES: ICD-10-CM

## 2019-12-30 DIAGNOSIS — I15.2 HYPERTENSION COMPLICATING DIABETES (HCC): ICD-10-CM

## 2019-12-30 DIAGNOSIS — E11.9 CONTROLLED TYPE 2 DIABETES MELLITUS WITHOUT COMPLICATION, WITHOUT LONG-TERM CURRENT USE OF INSULIN (HCC): ICD-10-CM

## 2019-12-30 PROCEDURE — 99214 OFFICE O/P EST MOD 30 MIN: CPT | Performed by: INTERNAL MEDICINE

## 2019-12-30 NOTE — ASSESSMENT & PLAN NOTE
Chronic and stable problem.  Appropriate to continue on atorvastatin 20 mg daily.  We will update his cholesterol panel in February 2020.

## 2019-12-30 NOTE — ASSESSMENT & PLAN NOTE
Chronic and stable problem.  I told him that if his hearing loss starts to impact his ability to have conversations then we should get an updated audiology exam with consideration for hearing aids to assist.  He declines at this time so we will continue with observation.

## 2019-12-30 NOTE — ASSESSMENT & PLAN NOTE
Chronic and stable problem.  Blood pressures overall have been at goal.  Weight remains stable.  Appropriate to continue on losartan 25 mg daily and based on follow-up urine microalbumin in February 2020 we could consider increasing to 50 mg daily.  Patient voiced understanding.

## 2019-12-30 NOTE — ASSESSMENT & PLAN NOTE
Chronic ongoing problem.  We will recheck his urine microalbumin in February 2020.  Appropriate to continue losartan 25 mg at this time, he declined dose increased to 50 mg when offered by his previous primary care provider a few months ago.  We will continue to follow closely.

## 2019-12-30 NOTE — ASSESSMENT & PLAN NOTE
Chronic and stable problem.  I shared with him that his natural immunity against the shingles will wear off after about a year so I would recommend he get the shingles vaccine.  He declines at this time since his first episode was mild in nature.  I told him to let me know if he does feel he has recurrent so I can get him antiviral medications and he voiced understanding.  I also talked about the increased concern if he would have it over the eyes or ears.

## 2019-12-30 NOTE — PROGRESS NOTES
Subjective:     CC:  Diagnoses of Controlled type 2 diabetes mellitus without complication, without long-term current use of insulin (HCC), Hypertension complicating diabetes (HCC), Hyperlipidemia associated with type 2 diabetes mellitus (HCC), Conductive hearing loss of right ear with unrestricted hearing of left ear, History of shingles, and Positive for macroalbuminuria were pertinent to this visit.    HISTORY OF THE PRESENT ILLNESS: Patient is a 84 y.o. male. This pleasant patient is here today to establish care and discuss the below stated chronic medical conditions. He is unaccompanied for today's visit.    Problem   History of Shingles    He reports a single episodes of shingles around 2016 or 2017.  This was located on his back and at first was unbeknownst to him until he developed the lesions and itching.  It healed without difficulty.  He was offered gabapentin at that time but declined taking it.  No postherpetic neuralgia.  No recurrence since that time.  He declines shingles vaccine.      Positive for Macroalbuminuria       Ref. Range 10/2/2017 08:22 4/12/2018 08:34 10/27/2018 08:31 2/20/2019 08:10 8/29/2019 08:13   Micro Alb Creat Ratio Latest Ref Range: 0 - 30 mg/g 76 (H) 115 (H) 92 (H) 202 (H) 102 (H)     He has history of proteinuria ranging anywhere from .  Most recent checked improved down to 102.  He is taking losartan 25 mg daily, previously on lisinopril but did not tolerate due to ACE cough.  He was offered to have his losartan increased at his last visit with his primary care provider but he declined and wished to instead work on being better hydrated.     Controlled Type 2 Diabetes Mellitus Without Complication, Without Long-Term Current Use of Insulin ([facility])       Ref. Range 8/25/2014 08:41 11/24/2014 07:06 6/3/2015 06:22 11/24/2015 10:05 3/3/2016 09:13 3/7/2016 08:52 10/3/2016 08:07 6/14/2017 08:18 10/2/2017 08:21 10/2/2017 08:22 4/12/2018 08:33 10/27/2018 08:31 2/20/2019 08:10  8/29/2019 08:13   Glycohemoglobin Latest Ref Range: 0.0 - 5.6 % 8.4 (H) 7.5 (H) 6.9 (H) 7.2 7.5 (H) 6.9 6.7 (H) 7.2 (H) 7.0 (H)  7.2 (H) 7.3 (H) 6.9 (H) 6.8 (H)       Type 2 diabetes diagnosed in 2014 after he was found to have an A1c of 8.4.  He completely changed his lifestyle and stop drinking sugary beverages in an effort to improve his blood sugar.  He also increase physical activity and reports that he is continues to do martial arts on occasion.      He was initiated on metformin around November 2018 as he was having trouble getting his A1c below 7.  He is taking metformin 500 mg once daily.  Subsequent A1c since starting the medication have improved in the 6.8-6.9 range.  No diarrhea or side effects from the metformin.    He has no prior history of retinopathy or neuropathy.  Last eye exam on December 10, 2018.  He was found to have proteinuria on urinalysis, unclear if this is all related to diabetes or if it is also due to hypertension or other primary kidney causes.  He was initiated on losartan and his proteinuria has stabilized/improved when compared to spring 2019.    No issues with hypoglycemia.  No history of foot ulceration.     Hyperlipidemia Associated With Type 2 Diabetes Mellitus (Hcc)       Ref. Range 8/29/2019 08:13   Cholesterol,Tot Latest Ref Range: 100 - 199 mg/dL 112   Triglycerides Latest Ref Range: 0 - 149 mg/dL 87   HDL Latest Ref Range: >=40 mg/dL 46   LDL Latest Ref Range: <100 mg/dL 49     He reports longstanding use of cholesterol medication in the form of atorvastatin 20 mg daily.  This was started around the time of his diabetes diagnosis.  At this time he denies any side effects such as myalgias or GI upset.      Hypertension complicating diabetes (HCC)       Ref. Range 8/29/2019 08:13   Sodium Latest Ref Range: 135 - 145 mmol/L 139   Potassium Latest Ref Range: 3.6 - 5.5 mmol/L 4.3   Chloride Latest Ref Range: 96 - 112 mmol/L 105   Co2 Latest Ref Range: 20 - 33 mmol/L 26   Anion  Gap Latest Ref Range: 0.0 - 11.9  8.0   Glucose Latest Ref Range: 65 - 99 mg/dL 128 (H)   Bun Latest Ref Range: 8 - 22 mg/dL 19   Creatinine Latest Ref Range: 0.50 - 1.40 mg/dL 1.04   GFR If Non  Latest Ref Range: >60 mL/min/1.73 m 2 >60     He was initially started on lisinopril and transitioned to losartan after developing an ACEI cough.  This is for both hypertension as well as proteinuria.  No issues with overtreatment or under treatment at this time.  He denies orthostasis.  He was offered to have his losartan increased to 50 mg due to ongoing proteinuria but declined.     Hearing Loss of Right Ear    Insidious onset, notes previously he had excellent hearing.  However, after he retired from the [employer] he noted discrepancy with hearing with significant loss on the right side.  He does not recall any specific hearing damage but was exposed to helicopters and was not wearing ear protection.  He denies any recent evaluation with audiology and is not wearing hearing aids.  At this time he says it is not impeding his day-to-day activities.            Allergies: No known drug allergy    Current Outpatient Medications Ordered in Epic   Medication Sig Dispense Refill   • metFORMIN (GLUCOPHAGE) 500 MG Tab TAKE ONE TABLET BY MOUTH ONE TIME DAILY WITH FOOD 90 Tab 3   • atorvastatin (LIPITOR) 20 MG Tab TAKE ONE TABLET BY MOUTH ONE TIME DAILY  90 Tab 3   • losartan (COZAAR) 25 MG Tab TAKE ONE TABLET BY MOUTH ONE TIME DAILY  90 Tab 3   • aspirin EC (ECOTRIN) 81 MG TBEC Take 81 mg by mouth every day.       No current [facility]-ordered facility-administered medications on file.        Past Medical History:   Diagnosis Date   • Cataract    • Diabetes mellitus type 2, controlled ([facility]) 12/3/2014   • Hearing loss of right ear 5/29/2014   • Hyperlipidemia 5/29/2014   • Hypertension 5/29/2014   • Metabolic syndrome 5/29/2014   • Pain in ankle 5/29/2014       Past Surgical History:   Procedure Laterality Date   •  "TONSILLECTOMY         Social History     Tobacco Use   • Smoking status: Never Smoker   • Smokeless tobacco: Never Used   • Tobacco comment: continued abstinence   Substance Use Topics   • Alcohol use: Yes     Alcohol/week: 0.6 oz     Types: 1 Glasses of wine per week     Frequency: 4 or more times a week     Drinks per session: 1 or 2     Binge frequency: Daily or almost daily     Comment: 1 glass red wine every night 5oz   • Drug use: No       Social History     Patient does not qualify to have social determinant information on file (likely too young).   Social History Narrative    He was born in [place] (May 14th 1939) and then relocated to the . He is  to Joy for 56 years, they met at college. They have 2 kids- Leonardo (52, [place]) and Samantha (54, [facility]). They have 3 grandkids. 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 They moved to [place] in 2007. He enjoys playing poker at the [employer] every morning. He has a brother in law who is a vascular surgeon in [place] and he speaks with frequently for additional medical advice.       Family History   Problem Relation Age of Onset   • Lung Disease Mother         Tuberculosis   • Arthritis Father         hip fracture       Health Maintenance: Completed    ROS:   As above in the HPI All Others Reviewed and Negative  Objective:     Exam: /76   Pulse 86   Temp 36.4 °C (97.5 °F) (Temporal)   Ht 1.651 m (5' 5\")   Wt 79.3 kg (174 lb 13.2 oz)   SpO2 95%  Body mass index is 29.09 kg/m².    General: Normal appearing. No distress. Appears younger than stated age.  EENT: Eyes conjunctiva clear lids without ptosis, extraocular movements intact, ears normal shape and contour, mild right cerumen impaction and clear external ear canal on the left, left tympanic membrane is benign, oropharynx is without erythema, edema or exudates. Fair dentition.   Neck: Supple without JVD. Thyroid is not " enlarged.  Pulmonary: Clear to ausculation.  Normal effort. No rales, ronchi, or wheezing. No cough.  Cardiovascular: Regular rate and rhythm without murmur. No lower extremity edema bilaterally.  Abdomen: Soft, nontender, nondistended. Normal bowel sounds.   Neurologic: No resting tremor, no increased tone or rigidity.  Lymph: No cervical or supraclavicular lymph nodes are palpable  Skin: Warm and dry.  No obvious lesions on skin exposed areas.  Musculoskeletal: Normal gait. No extremity cyanosis, clubbing, or edema. Patient ambulates independently without a gait aid.  Psych: Normal mood and affect. Alert and oriented x3. Judgment and insight is normal.    Assessment & Plan:   84 y.o. male with the following -    Problem List Items Addressed This Visit     Hyperlipidemia associated with type 2 diabetes mellitus (HCC)     Chronic and stable problem.  Appropriate to continue on atorvastatin 20 mg daily.  We will update his cholesterol panel in February 2020.         Relevant Orders    Comp Metabolic Panel    Lipid Profile    Hypertension complicating diabetes (HCC)     Chronic and stable problem.  Blood pressures overall have been at goal.  Weight remains stable.  Appropriate to continue on losartan 25 mg daily and based on follow-up urine microalbumin in February 2020 we could consider increasing to 50 mg daily.  Patient voiced understanding.         Relevant Orders    CBC WITH DIFFERENTIAL    Comp Metabolic Panel    Hearing loss of right ear     Chronic and stable problem.  I told him that if his hearing loss starts to impact his ability to have conversations then we should get an updated audiology exam with consideration for hearing aids to assist.  He declines at this time so we will continue with observation.         Controlled type 2 diabetes mellitus without complication, without long-term current use of insulin (HCC)     Chronic and stable problem.  Appropriate to continue on metformin 500 mg daily.  Next A1c  due in February 2020.  Appropriate to continue losartan at this time and we will follow-up on his urine microalbumin and if he remains elevated we could increase his losartan to 50 mg daily.  We will also update his kidney function and electrolytes at that time.         Relevant Orders    Comp Metabolic Panel    HEMOGLOBIN A1C    MICROALBUMIN CREAT RATIO URINE    Positive for macroalbuminuria     Chronic ongoing problem.  We will recheck his urine microalbumin in February 2020.  Appropriate to continue losartan 25 mg at this time, he declined dose increased to 50 mg when offered by his previous primary care provider a few months ago.  We will continue to follow closely.         History of shingles     Chronic and stable problem.  I shared with him that his natural immunity against the shingles will wear off after about a year so I would recommend he get the shingles vaccine.  He declines at this time since his first episode was mild in nature.  I told him to let me know if he does feel he has recurrent so I can get him antiviral medications and he voiced understanding.  I also talked about the increased concern if he would have it over the eyes or ears.              Return in about 7 months (around 7/30/2020).    Please note that this dictation was created using voice recognition software. I have made every reasonable attempt to correct obvious errors, but I expect that there are errors of grammar and possibly content that I did not discover before finalizing the note.

## 2019-12-30 NOTE — ASSESSMENT & PLAN NOTE
Chronic and stable problem.  Appropriate to continue on metformin 500 mg daily.  Next A1c due in February 2020.  Appropriate to continue losartan at this time and we will follow-up on his urine microalbumin and if he remains elevated we could increase his losartan to 50 mg daily.  We will also update his kidney function and electrolytes at that time.

## 2020-12-08 ENCOUNTER — HOSPITAL ENCOUNTER (OUTPATIENT)
Dept: LAB | Facility: MEDICAL CENTER | Age: 85
End: 2020-12-08
Attending: INTERNAL MEDICINE
Payer: MEDICARE

## 2020-12-08 DIAGNOSIS — I15.2 HYPERTENSION COMPLICATING DIABETES (HCC): ICD-10-CM

## 2020-12-08 DIAGNOSIS — E78.5 HYPERLIPIDEMIA ASSOCIATED WITH TYPE 2 DIABETES MELLITUS (HCC): ICD-10-CM

## 2020-12-08 DIAGNOSIS — E11.69 HYPERLIPIDEMIA ASSOCIATED WITH TYPE 2 DIABETES MELLITUS (HCC): ICD-10-CM

## 2020-12-08 DIAGNOSIS — E11.59 HYPERTENSION COMPLICATING DIABETES (HCC): ICD-10-CM

## 2020-12-08 DIAGNOSIS — E11.9 CONTROLLED TYPE 2 DIABETES MELLITUS WITHOUT COMPLICATION, WITHOUT LONG-TERM CURRENT USE OF INSULIN (HCC): ICD-10-CM

## 2020-12-08 LAB
ALBUMIN SERPL BCP-MCNC: 4.5 G/DL (ref 3.2–4.9)
ALBUMIN/GLOB SERPL: 1.5 G/DL
ALP SERPL-CCNC: 66 U/L (ref 30–99)
ALT SERPL-CCNC: 28 U/L (ref 2–50)
ANION GAP SERPL CALC-SCNC: 11 MMOL/L (ref 7–16)
AST SERPL-CCNC: 22 U/L (ref 12–45)
BASOPHILS # BLD AUTO: 1 % (ref 0–1.8)
BASOPHILS # BLD: 0.06 K/UL (ref 0–0.12)
BILIRUB SERPL-MCNC: 0.6 MG/DL (ref 0.1–1.5)
BUN SERPL-MCNC: 16 MG/DL (ref 8–22)
CALCIUM SERPL-MCNC: 9.7 MG/DL (ref 8.5–10.5)
CHLORIDE SERPL-SCNC: 102 MMOL/L (ref 96–112)
CHOLEST SERPL-MCNC: 130 MG/DL (ref 100–199)
CO2 SERPL-SCNC: 25 MMOL/L (ref 20–33)
CREAT SERPL-MCNC: 1 MG/DL (ref 0.5–1.4)
CREAT UR-MCNC: 133.45 MG/DL
EOSINOPHIL # BLD AUTO: 0.08 K/UL (ref 0–0.51)
EOSINOPHIL NFR BLD: 1.4 % (ref 0–6.9)
ERYTHROCYTE [DISTWIDTH] IN BLOOD BY AUTOMATED COUNT: 49.5 FL (ref 35.9–50)
EST. AVERAGE GLUCOSE BLD GHB EST-MCNC: 143 MG/DL
FASTING STATUS PATIENT QL REPORTED: NORMAL
GLOBULIN SER CALC-MCNC: 3 G/DL (ref 1.9–3.5)
GLUCOSE SERPL-MCNC: 111 MG/DL (ref 65–99)
HBA1C MFR BLD: 6.6 % (ref 0–5.6)
HCT VFR BLD AUTO: 48.4 % (ref 42–52)
HDLC SERPL-MCNC: 68 MG/DL
HGB BLD-MCNC: 15.8 G/DL (ref 14–18)
IMM GRANULOCYTES # BLD AUTO: 0.01 K/UL (ref 0–0.11)
IMM GRANULOCYTES NFR BLD AUTO: 0.2 % (ref 0–0.9)
LDLC SERPL CALC-MCNC: 48 MG/DL
LYMPHOCYTES # BLD AUTO: 2.13 K/UL (ref 1–4.8)
LYMPHOCYTES NFR BLD: 36.3 % (ref 22–41)
MCH RBC QN AUTO: 32 PG (ref 27–33)
MCHC RBC AUTO-ENTMCNC: 32.6 G/DL (ref 33.7–35.3)
MCV RBC AUTO: 98 FL (ref 81.4–97.8)
MICROALBUMIN UR-MCNC: 18.2 MG/DL
MICROALBUMIN/CREAT UR: 136 MG/G (ref 0–30)
MONOCYTES # BLD AUTO: 0.53 K/UL (ref 0–0.85)
MONOCYTES NFR BLD AUTO: 9 % (ref 0–13.4)
NEUTROPHILS # BLD AUTO: 3.05 K/UL (ref 1.82–7.42)
NEUTROPHILS NFR BLD: 52.1 % (ref 44–72)
NRBC # BLD AUTO: 0 K/UL
NRBC BLD-RTO: 0 /100 WBC
PLATELET # BLD AUTO: 219 K/UL (ref 164–446)
PMV BLD AUTO: 10 FL (ref 9–12.9)
POTASSIUM SERPL-SCNC: 4.5 MMOL/L (ref 3.6–5.5)
PROT SERPL-MCNC: 7.5 G/DL (ref 6–8.2)
RBC # BLD AUTO: 4.94 M/UL (ref 4.7–6.1)
SODIUM SERPL-SCNC: 138 MMOL/L (ref 135–145)
TRIGL SERPL-MCNC: 72 MG/DL (ref 0–149)
WBC # BLD AUTO: 5.9 K/UL (ref 4.8–10.8)

## 2020-12-08 PROCEDURE — 85025 COMPLETE CBC W/AUTO DIFF WBC: CPT

## 2020-12-08 PROCEDURE — 82043 UR ALBUMIN QUANTITATIVE: CPT

## 2020-12-08 PROCEDURE — 80061 LIPID PANEL: CPT

## 2020-12-08 PROCEDURE — 82570 ASSAY OF URINE CREATININE: CPT

## 2020-12-08 PROCEDURE — 83036 HEMOGLOBIN GLYCOSYLATED A1C: CPT

## 2020-12-08 PROCEDURE — 36415 COLL VENOUS BLD VENIPUNCTURE: CPT

## 2020-12-08 PROCEDURE — 80053 COMPREHEN METABOLIC PANEL: CPT

## 2020-12-10 NOTE — RESULT ENCOUNTER NOTE
Please call Jim and make sure he checks his results (in case I am not here next week for our appt)--    Overall things appear stable.  Cholesterol values are all at goal.  Metabolic panel shows normal electrolytes, kidneys, and liver function.  Your diabetes continues to improve, average blood sugar is down to 143.  Blood counts and bone marrow are stable.  There is mild protein in your urine related to the history of diabetes, this is stable and has been present for many years.

## 2020-12-15 ENCOUNTER — OFFICE VISIT (OUTPATIENT)
Dept: MEDICAL GROUP | Facility: MEDICAL CENTER | Age: 85
End: 2020-12-15
Payer: MEDICARE

## 2020-12-15 VITALS
HEIGHT: 65 IN | SYSTOLIC BLOOD PRESSURE: 144 MMHG | DIASTOLIC BLOOD PRESSURE: 60 MMHG | OXYGEN SATURATION: 97 % | TEMPERATURE: 97.4 F | WEIGHT: 164.68 LBS | BODY MASS INDEX: 27.44 KG/M2 | HEART RATE: 74 BPM

## 2020-12-15 DIAGNOSIS — E11.59 HYPERTENSION COMPLICATING DIABETES (HCC): ICD-10-CM

## 2020-12-15 DIAGNOSIS — R80.9 POSITIVE FOR MACROALBUMINURIA: ICD-10-CM

## 2020-12-15 DIAGNOSIS — E11.9 CONTROLLED TYPE 2 DIABETES MELLITUS WITHOUT COMPLICATION, WITHOUT LONG-TERM CURRENT USE OF INSULIN (HCC): ICD-10-CM

## 2020-12-15 DIAGNOSIS — E11.69 HYPERLIPIDEMIA ASSOCIATED WITH TYPE 2 DIABETES MELLITUS (HCC): ICD-10-CM

## 2020-12-15 DIAGNOSIS — I15.2 HYPERTENSION COMPLICATING DIABETES (HCC): ICD-10-CM

## 2020-12-15 DIAGNOSIS — E78.5 HYPERLIPIDEMIA ASSOCIATED WITH TYPE 2 DIABETES MELLITUS (HCC): ICD-10-CM

## 2020-12-15 PROCEDURE — 99214 OFFICE O/P EST MOD 30 MIN: CPT | Performed by: INTERNAL MEDICINE

## 2020-12-15 ASSESSMENT — PATIENT HEALTH QUESTIONNAIRE - PHQ9: CLINICAL INTERPRETATION OF PHQ2 SCORE: 0

## 2020-12-15 ASSESSMENT — FIBROSIS 4 INDEX: FIB4 SCORE: 1.58

## 2020-12-15 NOTE — ASSESSMENT & PLAN NOTE
Chronic and stable problem. A1c down to 6.6. Microalbuminuria remains stable. Offered to increase dose of losartan but he would like to hold off at this time and observe with repeat urinalysis in 3-4 months which I think is reasonable. Continue metformin, losartan, atorvastatin, and aspirin.

## 2020-12-15 NOTE — ASSESSMENT & PLAN NOTE
Chronic and stable problem.  Discussed that we could increase dose of losartan to help with kidney remodeling which would likely improve his albuminuria, he declines at this time would like to continue with observation.  Will recheck urine microalbumin in 3 months and have him continue on losartan 25 mg daily in the meantime.

## 2020-12-15 NOTE — PROGRESS NOTES
Subjective:   Chief Complaint/History of Present Illness:  Jim Valle is a 85 y.o. male established patient who presents today to discuss medical problems as listed below. He is unaccompanied for today's visit.    Problem   Positive for Macroalbuminuria       Ref. Range 8/25/2014 08:42 6/3/2015 06:23 3/3/2016 09:10 6/14/2017 08:18 10/2/2017 08:22 4/12/2018 08:34 10/27/2018 08:31 2/20/2019 08:10 8/29/2019 08:13 12/8/2020 06:34   Micro Alb Creat Ratio Latest Ref Range: 0 - 30 mg/g 191 (H) 43 (H) 65 (H) 114 (H) 76 (H) 115 (H) 92 (H) 202 (H) 102 (H) 136 (H)     He has history of proteinuria ranging anywhere from .  Most recent check stable at 136.  He is taking losartan 25 mg daily, previously on lisinopril but did not tolerate due to ACEI cough.  He was offered to have his losartan increased at his last visit with his primary care provider but he declined and wished to instead work on being better hydrated.     Controlled Type 2 Diabetes Mellitus Without Complication, Without Long-Term Current Use of Insulin ([facility])       Ref. Range 8/25/2014 11/24/2014 8/29/2019 12/8/2020   Glycohemoglobin Latest Ref Range: 0.0 - 5.6 % 8.4 (H) 7.5 (H) 6.8 (H) 6.6 (H)       Type 2 diabetes diagnosed in 2014 after he was found to have an A1c of 8.4.  He completely changed his lifestyle and stop drinking sugary beverages in an effort to improve his blood sugar.  He also increased physical activity and reports that he is continues to do martial arts.      He was initiated on metformin around November 2018 as he was having trouble getting his A1c below 7.  He is taking metformin 500 mg once daily.  Subsequent A1c since starting the medication have improved in the 6.6-6.9 range.  No diarrhea or side effects from the metformin.    He has no prior history of retinopathy or neuropathy.  Last eye exam on December 8, 2020.           Ref. Range 10/27/2018 08:31 2/20/2019 08:10 8/29/2019 08:13 12/8/2020 06:34   Micro Alb Creat Ratio Latest  Ref Range: 0 - 30 mg/g 92 (H) 202 (H) 102 (H) 136 (H)     He was found to have proteinuria on urinalysis, unclear if this is all related to diabetes or if it is also due to hypertension or other primary kidney causes.  He was initiated on losartan and his proteinuria has stabilized/improved when compared to Spring 2019, has ranged from low 40's to low 200's.    No issues with hypoglycemia.  No history of foot ulceration.    He is on statin, ARB, and aspirin.  Current regimen: metformin 500 mg daily     Hyperlipidemia Associated With Type 2 Diabetes Mellitus (Hcc)       Ref. Range 12/8/2020 06:35   Cholesterol,Tot Latest Ref Range: 100 - 199 mg/dL 130   Triglycerides Latest Ref Range: 0 - 149 mg/dL 72   HDL Latest Ref Range: >=40 mg/dL 68   LDL Latest Ref Range: <100 mg/dL 48     He reports longstanding use of cholesterol medication in the form of atorvastatin 20 mg daily.  This was started around the time of his diabetes diagnosis.  At this time he denies any side effects such as myalgias or GI upset.      Hypertension complicating diabetes ([facility])       Ref. Range 12/8/2020 06:35   Sodium Latest Ref Range: 135 - 145 mmol/L 138   Potassium Latest Ref Range: 3.6 - 5.5 mmol/L 4.5   Chloride Latest Ref Range: 96 - 112 mmol/L 102   Bun Latest Ref Range: 8 - 22 mg/dL 16   Creatinine Latest Ref Range: 0.50 - 1.40 mg/dL 1.00   GFR If Non  Latest Ref Range: >60 mL/min/1.73 m 2 >60     He was initially started on lisinopril and transitioned to losartan after developing an ACEI cough.  This is for both hypertension as well as proteinuria.  No issues with overtreatment or under treatment at this time.  He denies orthostasis.  He was offered to have his losartan increased to 50 mg due to ongoing proteinuria but declined.          Additional History:   Allergies:    No known drug allergy     Current Medications:     Current Outpatient Medications   Medication Sig Dispense Refill   • metFORMIN (GLUCOPHAGE) 500 MG  "Tab take 1 tablet by mouth once daily with food 90 Tab 3   • losartan (COZAAR) 25 MG Tab TAKE 1 TABLET BY MOUTH ONCE DAILY  90 Tab 3   • atorvastatin (LIPITOR) 20 MG Tab TAKE 1 TABLET BY MOUTH ONCE DAILY  90 Tab 3   • aspirin EC (ECOTRIN) 81 MG TBEC Take 81 mg by mouth every day.       No current facility-administered medications for this visit.         Social History:     Social History     Tobacco Use   • Smoking status: Never Smoker   • Smokeless tobacco: Never Used   • Tobacco comment: continued abstinence   Substance Use Topics   • Alcohol use: Yes     Alcohol/week: 0.6 oz     Types: 1 Glasses of wine per week     Frequency: 4 or more times a week     Drinks per session: 1 or 2     Binge frequency: Daily or almost daily     Comment: 1 glass red wine every night 5oz   • Drug use: No       ROS: As above in the HPI      Objective:   Physical Exam:    Vitals: /60 (BP Location: Left arm, Patient Position: Sitting, BP Cuff Size: Adult)   Pulse 74   Temp 36.3 °C (97.4 °F) (Temporal)   Ht 1.651 m (5' 5\")   Wt 74.7 kg (164 lb 10.9 oz)   SpO2 97%    BMI: Body mass index is 27.4 kg/m².   General/Constitutional: Vitals as above, Well nourished, well developed male in no acute distress   Head/Eyes: Head is grossly normal & atraumatic, bilateral conjunctivae clear and not injected, bilateral EOMI, bilateral PERRLA   ENT: Bilateral external ears grossly normal in appearance, Hearing grossly intact in left hear and impaired in right ear, scant blood in left external ear canal, External nares normal in appearance and without discharge/bleeding   Respiratory: No respiratory distress, bilateral lungs are clear to ausculation in all lung fields, no wheezing/rhonchi/rales   Cardiovascular: Regular rate and rhythm without murmur/rubs, distal pulses are intact and equal bilaterally (radial), no bilateral lower extremity edema   MSK: Gait grossly normal & not antalgic, he ambulates independently and without a gait " aid   Integumentary: No apparent rashes on skin exposed areas   Psych: Judgment grossly appropriate, no apparent depression/anxiety   Diabetic Foot Exam: No ulcers/laceration/blisters present on bilateral feet, normal gross anatomy of bilateral feet without abnormal curvature or arch, no toe deformities, + toenail thickness in right foot 3rd and 4th toes, no ingrown toenails, no increase in skin temperature bilaterally, no skin erythema to bilateral feet, bilateral dorsalis pedis and posterior tibial pulses 2+ and equal, Refill less than 2 seconds bilaterally, Brent 10 g monofilament testing normal in bilateral great toes, bilateral balls of feet at medial/lateral/mid ball of foot    Health Maintenance:     - Completed      Assessment and Plan:     Problem List Items Addressed This Visit     Hyperlipidemia associated with type 2 diabetes mellitus (HCC)     Chronic and stable problem.  Continue atorvastatin 20 mg daily.         Hypertension complicating diabetes (HCC)     Chronic and stable problem.  Continue losartan 25 mg daily. Offered to increase to 50 mg that he would like to defer at this time.         Controlled type 2 diabetes mellitus without complication, without long-term current use of insulin (HCC)     Chronic and stable problem. A1c down to 6.6. Microalbuminuria remains stable. Offered to increase dose of losartan but he would like to hold off at this time and observe with repeat urinalysis in 3-4 months which I think is reasonable. Continue metformin, losartan, atorvastatin, and aspirin.          Relevant Orders    Diabetic Monofilament Lower Extremity Exam (Completed)    Positive for macroalbuminuria     Chronic and stable problem.  Discussed that we could increase dose of losartan to help with kidney remodeling which would likely improve his albuminuria, he declines at this time would like to continue with observation.  Will recheck urine microalbumin in 3 months and have him continue on losartan  25 mg daily in the meantime.         Relevant Orders    MICROALBUMIN CREAT RATIO URINE             RTC: 9 months.    PLEASE NOTE: This dictation was created using voice recognition software. I have made every reasonable attempt to correct obvious errors, but I expect that there are errors of grammar and possibly content that I did not discover before finalizing the note.

## 2020-12-15 NOTE — ASSESSMENT & PLAN NOTE
Chronic and stable problem.  Continue losartan 25 mg daily. Offered to increase to 50 mg that he would like to defer at this time.

## 2021-01-11 DIAGNOSIS — Z23 NEED FOR VACCINATION: ICD-10-CM

## 2021-01-27 ENCOUNTER — IMMUNIZATION (OUTPATIENT)
Dept: FAMILY PLANNING/WOMEN'S HEALTH CLINIC | Facility: IMMUNIZATION CENTER | Age: 86
End: 2021-01-27
Attending: INTERNAL MEDICINE
Payer: MEDICARE

## 2021-01-27 DIAGNOSIS — Z23 NEED FOR VACCINATION: ICD-10-CM

## 2021-01-27 DIAGNOSIS — Z23 ENCOUNTER FOR VACCINATION: Primary | ICD-10-CM

## 2021-01-27 PROCEDURE — 0001A PFIZER SARS-COV-2 VACCINE: CPT

## 2021-01-27 PROCEDURE — 91300 PFIZER SARS-COV-2 VACCINE: CPT

## 2021-02-18 ENCOUNTER — IMMUNIZATION (OUTPATIENT)
Dept: FAMILY PLANNING/WOMEN'S HEALTH CLINIC | Facility: IMMUNIZATION CENTER | Age: 86
End: 2021-02-18
Attending: INTERNAL MEDICINE
Payer: MEDICARE

## 2021-02-18 DIAGNOSIS — Z23 ENCOUNTER FOR VACCINATION: Primary | ICD-10-CM

## 2021-02-18 PROCEDURE — 91300 PFIZER SARS-COV-2 VACCINE: CPT

## 2021-02-18 PROCEDURE — 0002A PFIZER SARS-COV-2 VACCINE: CPT

## 2021-08-17 ENCOUNTER — TELEPHONE (OUTPATIENT)
Dept: MEDICAL GROUP | Facility: PHYSICIAN GROUP | Age: 86
End: 2021-08-17

## 2021-08-17 DIAGNOSIS — E53.8 B12 DEFICIENCY: ICD-10-CM

## 2021-08-17 DIAGNOSIS — E55.9 VITAMIN D DEFICIENCY: ICD-10-CM

## 2021-08-17 DIAGNOSIS — E11.69 HYPERLIPIDEMIA ASSOCIATED WITH TYPE 2 DIABETES MELLITUS (HCC): ICD-10-CM

## 2021-08-17 DIAGNOSIS — E78.5 HYPERLIPIDEMIA ASSOCIATED WITH TYPE 2 DIABETES MELLITUS (HCC): ICD-10-CM

## 2021-08-17 DIAGNOSIS — E11.59 HYPERTENSION COMPLICATING DIABETES (HCC): ICD-10-CM

## 2021-08-17 DIAGNOSIS — I15.2 HYPERTENSION COMPLICATING DIABETES (HCC): ICD-10-CM

## 2021-08-17 DIAGNOSIS — R80.9 POSITIVE FOR MACROALBUMINURIA: ICD-10-CM

## 2021-08-17 DIAGNOSIS — E11.9 CONTROLLED TYPE 2 DIABETES MELLITUS WITHOUT COMPLICATION, WITHOUT LONG-TERM CURRENT USE OF INSULIN (HCC): ICD-10-CM

## 2021-08-17 NOTE — TELEPHONE ENCOUNTER
1. Caller Name: Jim Valle                          Call Back Number: [phone] (home) [phone] (work)        How would the patient prefer to be contacted with a response: Phone call OK to leave a detailed message    Jim called to ask about labs for his upcoming appointment  Dr. Baker will put the lab orders in this am.  Tried both phone numbers for Jim neither answered and could not leave a vm.       Will try My Chart

## 2021-09-23 ENCOUNTER — HOSPITAL ENCOUNTER (OUTPATIENT)
Dept: LAB | Facility: MEDICAL CENTER | Age: 86
End: 2021-09-23
Attending: INTERNAL MEDICINE
Payer: MEDICARE

## 2021-09-23 DIAGNOSIS — E78.5 HYPERLIPIDEMIA ASSOCIATED WITH TYPE 2 DIABETES MELLITUS (HCC): ICD-10-CM

## 2021-09-23 DIAGNOSIS — E11.69 HYPERLIPIDEMIA ASSOCIATED WITH TYPE 2 DIABETES MELLITUS (HCC): ICD-10-CM

## 2021-09-23 DIAGNOSIS — R80.9 POSITIVE FOR MACROALBUMINURIA: ICD-10-CM

## 2021-09-23 DIAGNOSIS — I15.2 HYPERTENSION COMPLICATING DIABETES (HCC): ICD-10-CM

## 2021-09-23 DIAGNOSIS — E55.9 VITAMIN D DEFICIENCY: ICD-10-CM

## 2021-09-23 DIAGNOSIS — E11.59 HYPERTENSION COMPLICATING DIABETES (HCC): ICD-10-CM

## 2021-09-23 DIAGNOSIS — E53.8 B12 DEFICIENCY: ICD-10-CM

## 2021-09-23 DIAGNOSIS — E11.9 CONTROLLED TYPE 2 DIABETES MELLITUS WITHOUT COMPLICATION, WITHOUT LONG-TERM CURRENT USE OF INSULIN (HCC): ICD-10-CM

## 2021-09-23 LAB
25(OH)D3 SERPL-MCNC: 32 NG/ML (ref 30–100)
ALBUMIN SERPL BCP-MCNC: 4.4 G/DL (ref 3.2–4.9)
ALBUMIN/GLOB SERPL: 1.4 G/DL
ALP SERPL-CCNC: 73 U/L (ref 30–99)
ALT SERPL-CCNC: 18 U/L (ref 2–50)
ANION GAP SERPL CALC-SCNC: 11 MMOL/L (ref 7–16)
AST SERPL-CCNC: 18 U/L (ref 12–45)
BASOPHILS # BLD AUTO: 0.7 % (ref 0–1.8)
BASOPHILS # BLD: 0.05 K/UL (ref 0–0.12)
BILIRUB SERPL-MCNC: 1.3 MG/DL (ref 0.1–1.5)
BUN SERPL-MCNC: 16 MG/DL (ref 8–22)
CALCIUM SERPL-MCNC: 9.8 MG/DL (ref 8.5–10.5)
CHLORIDE SERPL-SCNC: 102 MMOL/L (ref 96–112)
CHOLEST SERPL-MCNC: 121 MG/DL (ref 100–199)
CO2 SERPL-SCNC: 25 MMOL/L (ref 20–33)
CREAT SERPL-MCNC: 0.94 MG/DL (ref 0.5–1.4)
CREAT UR-MCNC: 133.33 MG/DL
EOSINOPHIL # BLD AUTO: 0.12 K/UL (ref 0–0.51)
EOSINOPHIL NFR BLD: 1.7 % (ref 0–6.9)
ERYTHROCYTE [DISTWIDTH] IN BLOOD BY AUTOMATED COUNT: 49 FL (ref 35.9–50)
EST. AVERAGE GLUCOSE BLD GHB EST-MCNC: 134 MG/DL
GLOBULIN SER CALC-MCNC: 3.2 G/DL (ref 1.9–3.5)
GLUCOSE SERPL-MCNC: 126 MG/DL (ref 65–99)
HBA1C MFR BLD: 6.3 % (ref 4–5.6)
HCT VFR BLD AUTO: 46.4 % (ref 42–52)
HDLC SERPL-MCNC: 55 MG/DL
HGB BLD-MCNC: 15.6 G/DL (ref 14–18)
IMM GRANULOCYTES # BLD AUTO: 0.02 K/UL (ref 0–0.11)
IMM GRANULOCYTES NFR BLD AUTO: 0.3 % (ref 0–0.9)
LDLC SERPL CALC-MCNC: 49 MG/DL
LYMPHOCYTES # BLD AUTO: 1.95 K/UL (ref 1–4.8)
LYMPHOCYTES NFR BLD: 28.1 % (ref 22–41)
MCH RBC QN AUTO: 32.8 PG (ref 27–33)
MCHC RBC AUTO-ENTMCNC: 33.6 G/DL (ref 33.7–35.3)
MCV RBC AUTO: 97.5 FL (ref 81.4–97.8)
MICROALBUMIN UR-MCNC: 17.6 MG/DL
MICROALBUMIN/CREAT UR: 132 MG/G (ref 0–30)
MONOCYTES # BLD AUTO: 0.59 K/UL (ref 0–0.85)
MONOCYTES NFR BLD AUTO: 8.5 % (ref 0–13.4)
NEUTROPHILS # BLD AUTO: 4.22 K/UL (ref 1.82–7.42)
NEUTROPHILS NFR BLD: 60.7 % (ref 44–72)
NRBC # BLD AUTO: 0 K/UL
NRBC BLD-RTO: 0 /100 WBC
PLATELET # BLD AUTO: 233 K/UL (ref 164–446)
PMV BLD AUTO: 9.9 FL (ref 9–12.9)
POTASSIUM SERPL-SCNC: 4.8 MMOL/L (ref 3.6–5.5)
PROT SERPL-MCNC: 7.6 G/DL (ref 6–8.2)
RBC # BLD AUTO: 4.76 M/UL (ref 4.7–6.1)
SODIUM SERPL-SCNC: 138 MMOL/L (ref 135–145)
TRIGL SERPL-MCNC: 86 MG/DL (ref 0–149)
VIT B12 SERPL-MCNC: 729 PG/ML (ref 211–911)
WBC # BLD AUTO: 7 K/UL (ref 4.8–10.8)

## 2021-09-23 PROCEDURE — 83036 HEMOGLOBIN GLYCOSYLATED A1C: CPT | Mod: GA

## 2021-09-23 PROCEDURE — 80061 LIPID PANEL: CPT

## 2021-09-23 PROCEDURE — 85025 COMPLETE CBC W/AUTO DIFF WBC: CPT

## 2021-09-23 PROCEDURE — 80053 COMPREHEN METABOLIC PANEL: CPT

## 2021-09-23 PROCEDURE — 82570 ASSAY OF URINE CREATININE: CPT

## 2021-09-23 PROCEDURE — 36415 COLL VENOUS BLD VENIPUNCTURE: CPT

## 2021-09-23 PROCEDURE — 82043 UR ALBUMIN QUANTITATIVE: CPT

## 2021-09-23 PROCEDURE — 82607 VITAMIN B-12: CPT

## 2021-09-23 PROCEDURE — 82306 VITAMIN D 25 HYDROXY: CPT

## 2021-09-28 ENCOUNTER — OFFICE VISIT (OUTPATIENT)
Dept: MEDICAL GROUP | Facility: PHYSICIAN GROUP | Age: 86
End: 2021-09-28
Payer: MEDICARE

## 2021-09-28 VITALS
OXYGEN SATURATION: 98 % | RESPIRATION RATE: 12 BRPM | SYSTOLIC BLOOD PRESSURE: 136 MMHG | DIASTOLIC BLOOD PRESSURE: 70 MMHG | HEIGHT: 65 IN | WEIGHT: 157.6 LBS | TEMPERATURE: 97.1 F | BODY MASS INDEX: 26.26 KG/M2 | HEART RATE: 67 BPM

## 2021-09-28 DIAGNOSIS — Z23 NEED FOR VACCINATION: ICD-10-CM

## 2021-09-28 DIAGNOSIS — R80.9 CONTROLLED TYPE 2 DIABETES MELLITUS WITH MICROALBUMINURIA, WITHOUT LONG-TERM CURRENT USE OF INSULIN (HCC): ICD-10-CM

## 2021-09-28 DIAGNOSIS — E11.29 CONTROLLED TYPE 2 DIABETES MELLITUS WITH MICROALBUMINURIA, WITHOUT LONG-TERM CURRENT USE OF INSULIN (HCC): ICD-10-CM

## 2021-09-28 DIAGNOSIS — E11.59 HYPERTENSION COMPLICATING DIABETES (HCC): ICD-10-CM

## 2021-09-28 DIAGNOSIS — E11.69 HYPERLIPIDEMIA ASSOCIATED WITH TYPE 2 DIABETES MELLITUS (HCC): ICD-10-CM

## 2021-09-28 DIAGNOSIS — E78.5 HYPERLIPIDEMIA ASSOCIATED WITH TYPE 2 DIABETES MELLITUS (HCC): ICD-10-CM

## 2021-09-28 DIAGNOSIS — I15.2 HYPERTENSION COMPLICATING DIABETES (HCC): ICD-10-CM

## 2021-09-28 PROCEDURE — 99214 OFFICE O/P EST MOD 30 MIN: CPT | Performed by: INTERNAL MEDICINE

## 2021-09-28 RX ORDER — LOSARTAN POTASSIUM 25 MG/1
25 TABLET ORAL DAILY
Qty: 90 TABLET | Refills: 3 | Status: SHIPPED | OUTPATIENT
Start: 2021-09-28

## 2021-09-28 RX ORDER — ATORVASTATIN CALCIUM 20 MG/1
20 TABLET, FILM COATED ORAL DAILY
Qty: 90 TABLET | Refills: 3 | Status: SHIPPED | OUTPATIENT
Start: 2021-09-28

## 2021-09-28 ASSESSMENT — FIBROSIS 4 INDEX: FIB4 SCORE: 1.51

## 2021-09-28 ASSESSMENT — PATIENT HEALTH QUESTIONNAIRE - PHQ9: CLINICAL INTERPRETATION OF PHQ2 SCORE: 0

## 2021-09-28 NOTE — PROGRESS NOTES
Subjective:   Chief Complaint/History of Present Illness:  Jim Valle is a 85 y.o. male established patient who presents today to discuss medical problems as listed below. Jim is unaccompanied for today's visit.    Problem   Controlled Type 2 Diabetes Mellitus With Microalbuminuria, Without Long-Term Current Use of Insulin ([facility])       Ref. Range 9/23/2021 07:21   Glycohemoglobin Latest Ref Range: 4.0 - 5.6 % 6.3 (H)   Estim. Avg Glu Latest Units: mg/dL 134     Type 2 diabetes diagnosed in 2014 after he was found to have an A1c of 8.4.  He completely changed his lifestyle and stop drinking sugary beverages in an effort to improve his blood sugar.  He also increased physical activity and reports that he is continues to do martial arts.      He was initiated on metformin around November 2018 as he was having trouble getting his A1c below 7.  He is taking metformin 500 mg once daily.  Subsequent A1c since starting the medication have improved in the 6.3-6.9 range.  No diarrhea or side effects from the metformin.    He has no prior history of retinopathy or neuropathy.  Last eye exam on December 8, 2020.       Ref. Range 9/23/2021 07:21   Micro Alb Creat Ratio Latest Ref Range: 0 - 30 mg/g 132 (H)     He was found to have microalbuminuria on urinalysis, unclear if this is all related to diabetes or if it is also due to hypertension or other primary kidney causes.  He was initiated on losartan and his proteinuria has stabilized/improved when compared to Spring 2019, has ranged from low 40's to low 200's.    No issues with hypoglycemia.  No history of foot ulceration.    He is on statin, ARB, and aspirin.  Current regimen: metformin 500 mg daily     Hyperlipidemia Associated With Type 2 Diabetes Mellitus (Hcc)       Ref. Range 9/23/2021 07:21   Cholesterol,Tot Latest Ref Range: 100 - 199 mg/dL 121   Triglycerides Latest Ref Range: 0 - 149 mg/dL 86   HDL Latest Ref Range: >=40 mg/dL 55   LDL Latest Ref Range: <100 mg/dL  49     He reports longstanding use of cholesterol medication.  This was started around the time of his diabetes diagnosis.  At this time he denies any side effects such as myalgias or GI upset.     Current regimen: Atorvastatin 20 mg daily     Hypertension complicating diabetes (HCC)    He was initially started on lisinopril and transitioned to losartan after developing an ACEI cough.  This is for both hypertension as well as microalbuminuria.  No issues with overtreatment or under treatment at this time.  He denies orthostasis.  He was offered to have his losartan increased to 50 mg due to ongoing proteinuria but declined.    Blood pressure in clinic improved to 136/70, previously 144/60    Current regimen: Losartan 25 mg daily     Positive for Macroalbuminuria (Resolved)       Ref. Range 8/25/2014 08:42 6/3/2015 06:23 3/3/2016 09:10 6/14/2017 08:18 10/2/2017 08:22 4/12/2018 08:34 10/27/2018 08:31 2/20/2019 08:10 8/29/2019 08:13 12/8/2020 06:34   Micro Alb Creat Ratio Latest Ref Range: 0 - 30 mg/g 191 (H) 43 (H) 65 (H) 114 (H) 76 (H) 115 (H) 92 (H) 202 (H) 102 (H) 136 (H)     He has history of proteinuria ranging anywhere from .  Most recent check stable at 136.  He is taking losartan 25 mg daily, previously on lisinopril but did not tolerate due to ACEI cough.  He was offered to have his losartan increased at his last visit with his primary care provider but he declined and wished to instead work on being better hydrated.          Current Medications:  Current Outpatient Medications Ordered in Epic   Medication Sig Dispense Refill   • atorvastatin (LIPITOR) 20 MG Tab Take 1 Tablet by mouth every day. 90 Tablet 3   • losartan (COZAAR) 25 MG Tab Take 1 Tablet by mouth every day. 90 Tablet 3   • metFORMIN (GLUCOPHAGE) 500 MG Tab Take 1 Tablet by mouth every day. WITH FOOD 90 Tablet 3   • aspirin EC (ECOTRIN) 81 MG TBEC Take 81 mg by mouth every day.       No current [facility]-ordered facility-administered  "medications on file.          Objective:   Physical Exam:    Vitals: /70 (BP Location: Right arm, Patient Position: Sitting, BP Cuff Size: Adult)   Pulse 67   Temp 36.2 °C (97.1 °F) (Temporal)   Resp 12   Ht 1.651 m (5' 5\")   Wt 71.5 kg (157 lb 9.6 oz)   SpO2 98%    BMI: Body mass index is 26.23 kg/m².  Physical Exam  Constitutional:       General: He is not in acute distress.     Appearance: Normal appearance. He is not ill-appearing.   HENT:      Right Ear: Tympanic membrane and ear canal normal. There is no impacted cerumen.      Left Ear: Tympanic membrane and ear canal normal. There is no impacted cerumen.   Eyes:      General: No scleral icterus.     Conjunctiva/sclera: Conjunctivae normal.   Cardiovascular:      Rate and Rhythm: Normal rate and regular rhythm.      Pulses: Normal pulses.      Heart sounds: No murmur heard.     Pulmonary:      Effort: Pulmonary effort is normal. No respiratory distress.      Breath sounds: Normal breath sounds. No wheezing or rhonchi.   Musculoskeletal:      Right lower leg: No edema.      Left lower leg: No edema.   Lymphadenopathy:      Cervical: No cervical adenopathy.   Skin:     General: Skin is warm and dry.      Findings: No rash.   Neurological:      Gait: Gait normal.   Psychiatric:         Mood and Affect: Mood normal.         Behavior: Behavior normal.         Thought Content: Thought content normal.         Judgment: Judgment normal.          Assessment and Plan:   Jim is a 85 y.o. male with the following:  Problem List Items Addressed This Visit     Hyperlipidemia associated with type 2 diabetes mellitus (HCC)     Chronic and stable problem.  All of his cholesterol values are in the normal parameter.  Continue atorvastatin 20 mg daily.         Relevant Medications    atorvastatin (LIPITOR) 20 MG Tab    metFORMIN (GLUCOPHAGE) 500 MG Tab    Other Relevant Orders    Lipid Profile    Hypertension complicating diabetes (HCC)     Chronic and ongoing " problem.  He has microalbuminuria likely from combination of hypertension and type 2 diabetes.  Continue losartan 25 mg daily.         Relevant Medications    atorvastatin (LIPITOR) 20 MG Tab    losartan (COZAAR) 25 MG Tab    metFORMIN (GLUCOPHAGE) 500 MG Tab    Other Relevant Orders    Comp Metabolic Panel    CBC WITH DIFFERENTIAL    MICROALBUMIN CREAT RATIO URINE    Controlled type 2 diabetes mellitus with microalbuminuria, without long-term current use of insulin (HCC)     Chronic and ongoing problem.  A1c at goal.  Continue Metformin 500 mg daily.  He is probably on statin and aspirin.  He is on ARB for coexisting microalbuminuria which is stabilized.         Relevant Medications    metFORMIN (GLUCOPHAGE) 500 MG Tab    Other Relevant Orders    Comp Metabolic Panel    HEMOGLOBIN A1C      Other Visit Diagnoses     Need for vaccination        Relevant Orders    Influenza Vaccine, High Dose (65+ Only)           RTC: Return in about 6 months (around 3/28/2022).    I spent a total of  34  minutes with record review, exam, communication with the patient, communication with other providers, and documentation of this encounter.    PLEASE NOTE: This dictation was created using voice recognition software. I have made every reasonable attempt to correct obvious errors, but I expect that there are errors of grammar and possibly content that I did not discover before finalizing the note.      Rachael Baker, DO  Geriatric and Internal Medicine  [facility]

## 2021-09-28 NOTE — ASSESSMENT & PLAN NOTE
Chronic and ongoing problem.  He has microalbuminuria likely from combination of hypertension and type 2 diabetes.  Continue losartan 25 mg daily.

## 2021-09-28 NOTE — ASSESSMENT & PLAN NOTE
Chronic and stable problem.  All of his cholesterol values are in the normal parameter.  Continue atorvastatin 20 mg daily.

## 2021-09-28 NOTE — ASSESSMENT & PLAN NOTE
Chronic and ongoing problem.  A1c at goal.  Continue Metformin 500 mg daily.  He is probably on statin and aspirin.  He is on ARB for coexisting microalbuminuria which is stabilized.

## 2022-03-28 ENCOUNTER — APPOINTMENT (OUTPATIENT)
Dept: MEDICAL GROUP | Facility: PHYSICIAN GROUP | Age: 87
End: 2022-03-28
Payer: COMMERCIAL

## 2022-11-08 ENCOUNTER — PATIENT MESSAGE (OUTPATIENT)
Dept: HEALTH INFORMATION MANAGEMENT | Facility: OTHER | Age: 87
End: 2022-11-08

## 2023-04-07 NOTE — PROGRESS NOTES
Pt states has upcoming scheduled eye exam 09/24/19 @ [facility]. Obtained pt signed consent form.   n/a